# Patient Record
Sex: FEMALE | Race: WHITE | Employment: OTHER | URBAN - METROPOLITAN AREA
[De-identification: names, ages, dates, MRNs, and addresses within clinical notes are randomized per-mention and may not be internally consistent; named-entity substitution may affect disease eponyms.]

---

## 2021-03-23 ENCOUNTER — HOSPITAL ENCOUNTER (OUTPATIENT)
Age: 61
Discharge: HOME | End: 2021-03-23
Payer: MEDICARE

## 2021-03-23 ENCOUNTER — HOSPITAL ENCOUNTER (OUTPATIENT)
Dept: GENERAL RADIOLOGY | Age: 61
Discharge: HOME | End: 2021-03-23
Payer: MEDICARE

## 2021-03-23 DIAGNOSIS — M48.062 SPINAL STENOSIS, LUMBAR REGION WITH NEUROGENIC CLAUDICATION: ICD-10-CM

## 2021-03-23 PROCEDURE — 72110 X-RAY EXAM L-2 SPINE 4/>VWS: CPT

## 2021-06-02 ENCOUNTER — CLINICAL DOCUMENTATION (OUTPATIENT)
Dept: OTHER | Age: 61
End: 2021-06-02

## 2021-07-13 ENCOUNTER — APPOINTMENT (OUTPATIENT)
Dept: ULTRASOUND IMAGING | Age: 61
DRG: 372 | End: 2021-07-13
Payer: MEDICARE

## 2021-07-13 ENCOUNTER — APPOINTMENT (OUTPATIENT)
Dept: CT IMAGING | Age: 61
DRG: 372 | End: 2021-07-13
Payer: MEDICARE

## 2021-07-13 ENCOUNTER — HOSPITAL ENCOUNTER (INPATIENT)
Age: 61
LOS: 4 days | Discharge: HOME | DRG: 372 | End: 2021-07-17
Attending: EMERGENCY MEDICINE | Admitting: HOSPITALIST
Payer: MEDICARE

## 2021-07-13 DIAGNOSIS — R19.7 DIARRHEA, UNSPECIFIED TYPE: ICD-10-CM

## 2021-07-13 DIAGNOSIS — R53.1 GENERALIZED WEAKNESS: Primary | ICD-10-CM

## 2021-07-13 DIAGNOSIS — R19.7 DIARRHEA OF PRESUMED INFECTIOUS ORIGIN: ICD-10-CM

## 2021-07-13 DIAGNOSIS — Z74.09 DECREASED AMBULATION STATUS: ICD-10-CM

## 2021-07-13 PROBLEM — K52.9 COLITIS: Status: ACTIVE | Noted: 2021-07-13

## 2021-07-13 LAB
A/G RATIO: 1.5 (ref 1.1–2.2)
ALBUMIN SERPL-MCNC: 4.3 G/DL (ref 3.4–5)
ALP BLD-CCNC: 79 U/L (ref 40–129)
ALT SERPL-CCNC: 9 U/L (ref 10–40)
ANION GAP SERPL CALCULATED.3IONS-SCNC: 14 MMOL/L (ref 3–16)
AST SERPL-CCNC: 16 U/L (ref 15–37)
BASOPHILS ABSOLUTE: 0 K/UL (ref 0–0.2)
BASOPHILS RELATIVE PERCENT: 0.7 %
BILIRUB SERPL-MCNC: 0.8 MG/DL (ref 0–1)
BILIRUBIN URINE: NEGATIVE
BLOOD, URINE: NEGATIVE
BUN BLDV-MCNC: 13 MG/DL (ref 7–20)
C DIFF TOXIN/ANTIGEN: NORMAL
CALCIUM SERPL-MCNC: 9.7 MG/DL (ref 8.3–10.6)
CHLORIDE BLD-SCNC: 103 MMOL/L (ref 99–110)
CLARITY: CLEAR
CO2: 21 MMOL/L (ref 21–32)
COLOR: YELLOW
CREAT SERPL-MCNC: 0.6 MG/DL (ref 0.6–1.2)
CRYPTOSPORIDIUM ANTIGEN STOOL: NORMAL
E HISTOLYTICA ANTIGEN STOOL: NORMAL
EOSINOPHILS ABSOLUTE: 0 K/UL (ref 0–0.6)
EOSINOPHILS RELATIVE PERCENT: 0.2 %
GFR AFRICAN AMERICAN: >60
GFR NON-AFRICAN AMERICAN: >60
GIARDIA ANTIGEN STOOL: NORMAL
GLOBULIN: 2.9 G/DL
GLUCOSE BLD-MCNC: 102 MG/DL (ref 70–99)
GLUCOSE URINE: NEGATIVE MG/DL
HCT VFR BLD CALC: 40.8 % (ref 36–48)
HEMOGLOBIN: 14 G/DL (ref 12–16)
KETONES, URINE: 40 MG/DL
LACTIC ACID, SEPSIS: 0.8 MMOL/L (ref 0.4–1.9)
LACTIC ACID, SEPSIS: 1.1 MMOL/L (ref 0.4–1.9)
LEUKOCYTE ESTERASE, URINE: NEGATIVE
LIPASE: 25 U/L (ref 13–60)
LYMPHOCYTES ABSOLUTE: 2.2 K/UL (ref 1–5.1)
LYMPHOCYTES RELATIVE PERCENT: 49.5 %
MAGNESIUM: 1.9 MG/DL (ref 1.8–2.4)
MCH RBC QN AUTO: 27.4 PG (ref 26–34)
MCHC RBC AUTO-ENTMCNC: 34.4 G/DL (ref 31–36)
MCV RBC AUTO: 79.6 FL (ref 80–100)
MICROSCOPIC EXAMINATION: ABNORMAL
MONOCYTES ABSOLUTE: 0.3 K/UL (ref 0–1.3)
MONOCYTES RELATIVE PERCENT: 5.8 %
NEUTROPHILS ABSOLUTE: 1.9 K/UL (ref 1.7–7.7)
NEUTROPHILS RELATIVE PERCENT: 43.8 %
NITRITE, URINE: NEGATIVE
PDW BLD-RTO: 13.7 % (ref 12.4–15.4)
PH UA: 8.5 (ref 5–8)
PHOSPHORUS: 1.9 MG/DL (ref 2.5–4.9)
PLATELET # BLD: 163 K/UL (ref 135–450)
PMV BLD AUTO: 7.7 FL (ref 5–10.5)
POTASSIUM REFLEX MAGNESIUM: 3.2 MMOL/L (ref 3.5–5.1)
PROTEIN UA: NEGATIVE MG/DL
RBC # BLD: 5.12 M/UL (ref 4–5.2)
SODIUM BLD-SCNC: 138 MMOL/L (ref 136–145)
SPECIFIC GRAVITY UA: >1.03 (ref 1–1.03)
TOTAL PROTEIN: 7.2 G/DL (ref 6.4–8.2)
URINE REFLEX TO CULTURE: ABNORMAL
URINE TYPE: ABNORMAL
UROBILINOGEN, URINE: 0.2 E.U./DL
WBC # BLD: 4.4 K/UL (ref 4–11)

## 2021-07-13 PROCEDURE — 96375 TX/PRO/DX INJ NEW DRUG ADDON: CPT

## 2021-07-13 PROCEDURE — 96361 HYDRATE IV INFUSION ADD-ON: CPT

## 2021-07-13 PROCEDURE — 87336 ENTAMOEB HIST DISPR AG IA: CPT

## 2021-07-13 PROCEDURE — 36415 COLL VENOUS BLD VENIPUNCTURE: CPT

## 2021-07-13 PROCEDURE — 83690 ASSAY OF LIPASE: CPT

## 2021-07-13 PROCEDURE — 81003 URINALYSIS AUTO W/O SCOPE: CPT

## 2021-07-13 PROCEDURE — 87449 NOS EACH ORGANISM AG IA: CPT

## 2021-07-13 PROCEDURE — 85025 COMPLETE CBC W/AUTO DIFF WBC: CPT

## 2021-07-13 PROCEDURE — 87040 BLOOD CULTURE FOR BACTERIA: CPT

## 2021-07-13 PROCEDURE — 83605 ASSAY OF LACTIC ACID: CPT

## 2021-07-13 PROCEDURE — 87328 CRYPTOSPORIDIUM AG IA: CPT

## 2021-07-13 PROCEDURE — 93005 ELECTROCARDIOGRAM TRACING: CPT | Performed by: NURSE PRACTITIONER

## 2021-07-13 PROCEDURE — 96376 TX/PRO/DX INJ SAME DRUG ADON: CPT

## 2021-07-13 PROCEDURE — 76705 ECHO EXAM OF ABDOMEN: CPT

## 2021-07-13 PROCEDURE — 87505 NFCT AGENT DETECTION GI: CPT

## 2021-07-13 PROCEDURE — 6370000000 HC RX 637 (ALT 250 FOR IP): Performed by: NURSE PRACTITIONER

## 2021-07-13 PROCEDURE — 6370000000 HC RX 637 (ALT 250 FOR IP): Performed by: HOSPITALIST

## 2021-07-13 PROCEDURE — 74177 CT ABD & PELVIS W/CONTRAST: CPT

## 2021-07-13 PROCEDURE — 1200000000 HC SEMI PRIVATE

## 2021-07-13 PROCEDURE — 99283 EMERGENCY DEPT VISIT LOW MDM: CPT

## 2021-07-13 PROCEDURE — 96365 THER/PROPH/DIAG IV INF INIT: CPT

## 2021-07-13 PROCEDURE — 2500000003 HC RX 250 WO HCPCS: Performed by: NURSE PRACTITIONER

## 2021-07-13 PROCEDURE — 2580000003 HC RX 258: Performed by: HOSPITALIST

## 2021-07-13 PROCEDURE — 83735 ASSAY OF MAGNESIUM: CPT

## 2021-07-13 PROCEDURE — 6360000002 HC RX W HCPCS: Performed by: NURSE PRACTITIONER

## 2021-07-13 PROCEDURE — 80053 COMPREHEN METABOLIC PANEL: CPT

## 2021-07-13 PROCEDURE — 2580000003 HC RX 258: Performed by: NURSE PRACTITIONER

## 2021-07-13 PROCEDURE — 87324 CLOSTRIDIUM AG IA: CPT

## 2021-07-13 PROCEDURE — 84100 ASSAY OF PHOSPHORUS: CPT

## 2021-07-13 PROCEDURE — 6360000004 HC RX CONTRAST MEDICATION: Performed by: NURSE PRACTITIONER

## 2021-07-13 RX ORDER — ONDANSETRON 2 MG/ML
4 INJECTION INTRAMUSCULAR; INTRAVENOUS EVERY 30 MIN PRN
Status: COMPLETED | OUTPATIENT
Start: 2021-07-13 | End: 2021-07-13

## 2021-07-13 RX ORDER — BACLOFEN 500 UG/ML
1 INJECTION, SOLUTION INTRATHECAL DAILY
Status: DISCONTINUED | OUTPATIENT
Start: 2021-07-14 | End: 2021-07-17 | Stop reason: HOSPADM

## 2021-07-13 RX ORDER — VITAMIN B COMPLEX
2000 TABLET ORAL 2 TIMES DAILY
Status: DISCONTINUED | OUTPATIENT
Start: 2021-07-13 | End: 2021-07-17 | Stop reason: HOSPADM

## 2021-07-13 RX ORDER — TIZANIDINE 4 MG/1
4 TABLET ORAL DAILY
COMMUNITY

## 2021-07-13 RX ORDER — LISINOPRIL 5 MG/1
2.5 TABLET ORAL DAILY
COMMUNITY
Start: 2021-04-08

## 2021-07-13 RX ORDER — NADOLOL 40 MG/1
40 TABLET ORAL DAILY
Status: DISCONTINUED | OUTPATIENT
Start: 2021-07-14 | End: 2021-07-17 | Stop reason: HOSPADM

## 2021-07-13 RX ORDER — POLYETHYLENE GLYCOL 3350 17 G/17G
17 POWDER, FOR SOLUTION ORAL DAILY PRN
Status: DISCONTINUED | OUTPATIENT
Start: 2021-07-13 | End: 2021-07-17 | Stop reason: HOSPADM

## 2021-07-13 RX ORDER — SODIUM CHLORIDE 0.9 % (FLUSH) 0.9 %
5-40 SYRINGE (ML) INJECTION PRN
Status: DISCONTINUED | OUTPATIENT
Start: 2021-07-13 | End: 2021-07-17 | Stop reason: HOSPADM

## 2021-07-13 RX ORDER — CIPROFLOXACIN 2 MG/ML
400 INJECTION, SOLUTION INTRAVENOUS EVERY 12 HOURS
Status: DISCONTINUED | OUTPATIENT
Start: 2021-07-14 | End: 2021-07-17

## 2021-07-13 RX ORDER — SODIUM CHLORIDE 9 MG/ML
INJECTION, SOLUTION INTRAVENOUS CONTINUOUS
Status: DISCONTINUED | OUTPATIENT
Start: 2021-07-13 | End: 2021-07-15

## 2021-07-13 RX ORDER — 0.9 % SODIUM CHLORIDE 0.9 %
1000 INTRAVENOUS SOLUTION INTRAVENOUS ONCE
Status: COMPLETED | OUTPATIENT
Start: 2021-07-13 | End: 2021-07-13

## 2021-07-13 RX ORDER — SUMATRIPTAN 50 MG/1
100 TABLET, FILM COATED ORAL DAILY PRN
Status: COMPLETED | OUTPATIENT
Start: 2021-07-13 | End: 2021-07-14

## 2021-07-13 RX ORDER — PRAVASTATIN SODIUM 10 MG
20 TABLET ORAL NIGHTLY
Status: DISCONTINUED | OUTPATIENT
Start: 2021-07-13 | End: 2021-07-17 | Stop reason: HOSPADM

## 2021-07-13 RX ORDER — ONDANSETRON 2 MG/ML
4 INJECTION INTRAMUSCULAR; INTRAVENOUS EVERY 6 HOURS PRN
Status: DISCONTINUED | OUTPATIENT
Start: 2021-07-13 | End: 2021-07-17 | Stop reason: HOSPADM

## 2021-07-13 RX ORDER — POTASSIUM CHLORIDE 20 MEQ/1
40 TABLET, EXTENDED RELEASE ORAL 2 TIMES DAILY WITH MEALS
Status: DISCONTINUED | OUTPATIENT
Start: 2021-07-13 | End: 2021-07-13

## 2021-07-13 RX ORDER — PRAVASTATIN SODIUM 20 MG
20 TABLET ORAL DAILY
COMMUNITY

## 2021-07-13 RX ORDER — ESCITALOPRAM OXALATE 10 MG/1
10 TABLET ORAL DAILY
Status: DISCONTINUED | OUTPATIENT
Start: 2021-07-14 | End: 2021-07-17 | Stop reason: HOSPADM

## 2021-07-13 RX ORDER — ACETAMINOPHEN 650 MG/1
650 SUPPOSITORY RECTAL EVERY 6 HOURS PRN
Status: DISCONTINUED | OUTPATIENT
Start: 2021-07-13 | End: 2021-07-17 | Stop reason: HOSPADM

## 2021-07-13 RX ORDER — TIZANIDINE 4 MG/1
4 TABLET ORAL DAILY
Status: DISCONTINUED | OUTPATIENT
Start: 2021-07-14 | End: 2021-07-17 | Stop reason: HOSPADM

## 2021-07-13 RX ORDER — PRAVASTATIN SODIUM 20 MG
20 TABLET ORAL DAILY
COMMUNITY
Start: 2021-04-08 | End: 2021-07-13

## 2021-07-13 RX ORDER — PANTOPRAZOLE SODIUM 40 MG/1
40 TABLET, DELAYED RELEASE ORAL
Status: DISCONTINUED | OUTPATIENT
Start: 2021-07-14 | End: 2021-07-17 | Stop reason: HOSPADM

## 2021-07-13 RX ORDER — OMEPRAZOLE 40 MG/1
40 CAPSULE, DELAYED RELEASE ORAL DAILY
COMMUNITY

## 2021-07-13 RX ORDER — ONDANSETRON 4 MG/1
4 TABLET, ORALLY DISINTEGRATING ORAL EVERY 8 HOURS PRN
Status: DISCONTINUED | OUTPATIENT
Start: 2021-07-13 | End: 2021-07-17 | Stop reason: HOSPADM

## 2021-07-13 RX ORDER — MONTELUKAST SODIUM 10 MG/1
10 TABLET ORAL NIGHTLY
Status: DISCONTINUED | OUTPATIENT
Start: 2021-07-13 | End: 2021-07-17 | Stop reason: HOSPADM

## 2021-07-13 RX ORDER — SODIUM CHLORIDE 9 MG/ML
25 INJECTION, SOLUTION INTRAVENOUS PRN
Status: DISCONTINUED | OUTPATIENT
Start: 2021-07-13 | End: 2021-07-17 | Stop reason: HOSPADM

## 2021-07-13 RX ORDER — POTASSIUM CHLORIDE 7.45 MG/ML
10 INJECTION INTRAVENOUS PRN
Status: DISCONTINUED | OUTPATIENT
Start: 2021-07-13 | End: 2021-07-17 | Stop reason: HOSPADM

## 2021-07-13 RX ORDER — SODIUM CHLORIDE 0.9 % (FLUSH) 0.9 %
5-40 SYRINGE (ML) INJECTION EVERY 12 HOURS SCHEDULED
Status: DISCONTINUED | OUTPATIENT
Start: 2021-07-13 | End: 2021-07-17 | Stop reason: HOSPADM

## 2021-07-13 RX ORDER — CIPROFLOXACIN 2 MG/ML
400 INJECTION, SOLUTION INTRAVENOUS ONCE
Status: COMPLETED | OUTPATIENT
Start: 2021-07-13 | End: 2021-07-13

## 2021-07-13 RX ORDER — ACETAMINOPHEN 325 MG/1
650 TABLET ORAL EVERY 6 HOURS PRN
Status: DISCONTINUED | OUTPATIENT
Start: 2021-07-13 | End: 2021-07-17 | Stop reason: HOSPADM

## 2021-07-13 RX ADMIN — MONTELUKAST 10 MG: 10 TABLET, FILM COATED ORAL at 22:16

## 2021-07-13 RX ADMIN — IOPAMIDOL 75 ML: 755 INJECTION, SOLUTION INTRAVENOUS at 15:14

## 2021-07-13 RX ADMIN — Medication 2000 UNITS: at 22:16

## 2021-07-13 RX ADMIN — CIPROFLOXACIN 400 MG: 2 INJECTION, SOLUTION INTRAVENOUS at 18:47

## 2021-07-13 RX ADMIN — SODIUM CHLORIDE 1000 ML: 9 INJECTION, SOLUTION INTRAVENOUS at 18:38

## 2021-07-13 RX ADMIN — SODIUM CHLORIDE 1000 ML: 9 INJECTION, SOLUTION INTRAVENOUS at 15:57

## 2021-07-13 RX ADMIN — ONDANSETRON 4 MG: 2 INJECTION INTRAMUSCULAR; INTRAVENOUS at 15:55

## 2021-07-13 RX ADMIN — ONDANSETRON 4 MG: 2 INJECTION INTRAMUSCULAR; INTRAVENOUS at 18:46

## 2021-07-13 RX ADMIN — POTASSIUM CHLORIDE 40 MEQ: 1500 TABLET, EXTENDED RELEASE ORAL at 18:46

## 2021-07-13 RX ADMIN — Medication 20 MG: at 15:56

## 2021-07-13 RX ADMIN — SODIUM CHLORIDE, PRESERVATIVE FREE 10 ML: 5 INJECTION INTRAVENOUS at 22:15

## 2021-07-13 RX ADMIN — PRAVASTATIN SODIUM 20 MG: 10 TABLET ORAL at 22:16

## 2021-07-13 RX ADMIN — SODIUM CHLORIDE: 9 INJECTION, SOLUTION INTRAVENOUS at 22:16

## 2021-07-13 RX ADMIN — METRONIDAZOLE 500 MG: 500 INJECTION, SOLUTION INTRAVENOUS at 20:04

## 2021-07-13 ASSESSMENT — PAIN SCALES - GENERAL
PAINLEVEL_OUTOF10: 0
PAINLEVEL_OUTOF10: 7

## 2021-07-13 ASSESSMENT — PAIN DESCRIPTION - LOCATION: LOCATION: LEG

## 2021-07-13 ASSESSMENT — PAIN DESCRIPTION - PAIN TYPE: TYPE: CHRONIC PAIN

## 2021-07-13 ASSESSMENT — PAIN DESCRIPTION - ORIENTATION: ORIENTATION: RIGHT;LEFT

## 2021-07-13 NOTE — ED NOTES
Requested pt to get out of bed to test ambulation, pt stated \" I can't walk let alone get out of bed\" Chris Allen NP aware      Francine Nice RN  07/13/21 9785

## 2021-07-13 NOTE — ED PROVIDER NOTES
I independently evaluated and obtained a history and physical on Moent Walden. All diagnostic, treatment, and disposition assistants were made to myself in conjunction the advanced practice provider. For further details of this patient's emergency department encounter, please see the advanced practice provider's documentation. History: 78-year-old female presents with nausea, vomiting, diarrhea and abdominal pain which is been present for multiple weeks but is progressively worsened despite outpatient management    Physician Exam:   Constitutional:  Well developed, well nourished, no acute distress  Eyes:  Sclera nonicteric, conjunctiva moist  HENT:  Atraumatic, nose normal  Neck: Supple, no JVD  Respiratory:  Lungs clear to auscultation bilaterally, no retractions, no accessory muscle use  Cardiovascular: Bradycardic rate, normal rhythm, no murmurs  GI:  Soft, no overt abdominal tenderness, no McBurney's point tenderness, no guarding, bowel sounds present, no audible bruits or palpable pulsatile masses. Back: no CVA tenderness  Musculoskeletal:  No edema, no acute deformities  Integument: No rash, dry skin. Does seem flushed and diaphoretic. Neurologic:  Alert & oriented, no slurred speech    Mdm: Labs are generally reassuring. C. difficile testing sent out. CT shows evidence of inflammation but no evidence of intestinal surgical complication otherwise. The patient states she is still too weak to ambulate and has been unsteady on her feet and generally fatigued so we will continue IV fluid along with Cipro/Flagyl and admit.     Impression: Diarrhea, weakness, dumping    (Please note that portions of this note may have been completed with a voice recognition program. Efforts were made to edit the dictations but occasionally words are mis-transcribed.)         Enoc Romero MD  07/14/21 9709

## 2021-07-13 NOTE — ED PROVIDER NOTES
[address] [facility]  [address] [address]  Dept: [phone]  Loc: [address] ENCOUNTER        This patient was seen and evaluated per myself in conjunction with ED attending Dr. Yris Higgins. CHIEF COMPLAINT    Chief Complaint   Patient presents with    Fatigue     Cold sweats for about 3 weeks, having weakness, and now loose stools since Friday. Saw PCP yesterday and she was sent for blood work.  Diarrhea       HPI    Annetta Lagunas is a 59 y.o. female who presents with nausea and vomiting, associated with diarrhea. Onset was about 3 weeks ago, originally started with intermittent chills and sweats. Then she developed some diarrhea intermittently. However it is progressively worsened over the past week, therefore she made an appointment with her primary care provider yesterday. Was sent to [facility] for outpatient blood testing to be done. She states that she then had worsening abdominal pain and diarrhea with increased generalized fatigue over the past 24 hours since she saw her PCP, she called their office and was told to come to the ED for further testing and evaluation. The duration has been intermittent since the onset. The quality of the diarrhea is watery. She states that it had a very foul smelling odor to it yesterday. She states that it was also \"bright yellow. \"  No previous abdominal surgeries other than a pain pump being placed that resides in her abdominal cavity. The context is that the symptoms started spontaneously. There are no aggravating or alleviating factors. The patient complains of associated diffuse abdominal cramping. The patient denies the following risk factors: Ingestion of well water or stream water, recent travel out of the country, or antibiotic use. Came to the ED for further evaluation and treatment as requested by her primary care provider staff.     REVIEW OF SYSTEMS GI: see HPI, No hematochezia  Cardiac: No chest pain, no syncope  Pulmonary: No difficulty breathing, no new cough  General: No fevers  : No hematuria, no dysuria  See HPI for further details. All other systems reviewed and are negative. PAST MEDICAL & SURGICAL HISTORY    Past Medical History:   Diagnosis Date    Depression     Hypertension     IBS (irritable bowel syndrome)     Migraines     RA (rheumatoid arthritis) (HCC)     RSD (reflex sympathetic dystrophy)      Past Surgical History:   Procedure Laterality Date    BACLOFEN PUMP IMPLANTATION Left     Pain pump    KNEE SURGERY Right     Arthroscopy / severed a nerve    NERVE SURGERY Right     Nerve clipping Rt leg above knee    SYMPATHECTOMY      TONSILLECTOMY      UPPER GASTROINTESTINAL ENDOSCOPY  10/03/2016       CURRENT MEDICATIONS  (may include discharge medications prescribed in the ED)   Current Outpatient Rx   Medication Sig Dispense Refill    abatacept (ORENCIA) 250 MG injection Infuse 125 mg intravenously once a week      vitamin D (CHOLECALCIFEROL) 25 MCG (1000 UT) TABS tablet Take 2,000 Units by mouth 2 times daily      lisinopril (PRINIVIL;ZESTRIL) 5 MG tablet Take 2.5 mg by mouth daily If SBP > 130      omeprazole (PRILOSEC) 40 MG delayed release capsule Take 40 mg by mouth daily      tiZANidine (ZANAFLEX) 4 MG tablet Take 4 mg by mouth daily      pravastatin (PRAVACHOL) 20 MG tablet Take 20 mg by mouth daily      CLONIDINE HCL, ANALGESIA, EP 49.95 mcg by Intrathecal route daily Pain pump syringe 200.0 mcg / ml      lubiprostone (AMITIZA) 8 MCG CAPS capsule Take 8 mcg by mouth 2 times daily (with meals)      nadolol (CORGARD) 40 MG tablet Take 40 mg by mouth daily.  triamterene-hydrochlorothiazide (DYAZIDE) 37.5-25 MG per capsule Take 1 capsule by mouth every morning.  montelukast (SINGULAIR) 10 MG tablet Take 10 mg by mouth nightly.       SUMAtriptan (IMITREX) 100 MG tablet Take 100 mg by mouth once as needed for Migraine.  escitalopram (LEXAPRO) 10 MG tablet Take 10 mg by mouth daily.  HYDROmorphone (DILAUDID) 10 MG/ML injection 4.995 mg by Intrathecal route daily Pain pump Syringe 20.0mg/ ml      baclofen (LIORESAL) 10 MG/20ML SOLN 149.85 mcg by Intrathecal route daily Pain pump syringe 600.0 mcg/ ml         ALLERGIES    Allergies   Allergen Reactions    Latex Rash    Pcn [Penicillins]        SOCIAL AND FAMILY HISTORY    Social History     Socioeconomic History    Marital status: Single     Spouse name: Not on file    Number of children: Not on file    Years of education: Not on file    Highest education level: Not on file   Occupational History    Not on file   Tobacco Use    Smoking status: Never Smoker    Smokeless tobacco: Never Used   Substance and Sexual Activity    Alcohol use: No    Drug use: No    Sexual activity: Not on file   Other Topics Concern    Not on file   Social History Narrative    Not on file     Social Determinants of Health     Financial Resource Strain:     Difficulty of Paying Living Expenses:    Food Insecurity:     Worried About Running Out of Food in the Last Year:     Ran Out of Food in the Last Year:    Transportation Needs:     Lack of Transportation (Medical):      Lack of Transportation (Non-Medical):    Physical Activity:     Days of Exercise per Week:     Minutes of Exercise per Session:    Stress:     Feeling of Stress :    Social Connections:     Frequency of Communication with Friends and Family:     Frequency of Social Gatherings with Friends and Family:     Attends Holiness Services:     Active Member of Clubs or Organizations:     Attends Club or Organization Meetings:     Marital Status:    Intimate Partner Violence:     Fear of Current or Ex-Partner:     Emotionally Abused:     Physically Abused:     Sexually Abused:      Family History   Problem Relation Age of Onset    Cancer Mother     Cancer Father     Heart Disease Brother     Cancer Maternal Grandmother     Cancer Maternal Grandfather        PHYSICAL EXAM    VITAL SIGNS: BP (!) 152/71   Pulse 51   Temp 97.4 °F (36.3 °C) (Oral)   Resp 20   Ht 5' 3.5\" (1.613 m)   Wt 156 lb (70.8 kg)   SpO2 100%   BMI 27.20 kg/m²   Constitutional:  Well developed, well nourished, no acute distress  Eyes:  Sclera nonicteric, conjunctiva moist  HENT:  Atraumatic, nose normal  Neck: Supple, no JVD  Respiratory:  Lungs clear to auscultation bilaterally, no retractions, no accessory muscle use  Cardiovascular: Bradycardic rate, normal rhythm, no murmurs  GI:  Soft, no overt abdominal tenderness, no McBurney's point tenderness, no guarding, bowel sounds present, no audible bruits or palpable pulsatile masses. Back: no CVA tenderness  Musculoskeletal:  No edema, no acute deformities  Integument: No rash, dry skin. Does seem flushed and diaphoretic.   Neurologic:  Alert & oriented, no slurred speech    LABS  Results for orders placed or performed during the hospital encounter of 07/13/21   Clostridium Difficile Toxin/Antigen    Specimen: Stool   Result Value Ref Range    C.diff Toxin/Antigen       Negative for Clostridium difficile antigen and toxin  Normal Range: Negative     CBC Auto Differential   Result Value Ref Range    WBC 4.4 4.0 - 11.0 K/uL    RBC 5.12 4.00 - 5.20 M/uL    Hemoglobin 14.0 12.0 - 16.0 g/dL    Hematocrit 40.8 36.0 - 48.0 %    MCV 79.6 (L) 80.0 - 100.0 fL    MCH 27.4 26.0 - 34.0 pg    MCHC 34.4 31.0 - 36.0 g/dL    RDW 13.7 12.4 - 15.4 %    Platelets 449 231 - 868 K/uL    MPV 7.7 5.0 - 10.5 fL    Neutrophils % 43.8 %    Lymphocytes % 49.5 %    Monocytes % 5.8 %    Eosinophils % 0.2 %    Basophils % 0.7 %    Neutrophils Absolute 1.9 1.7 - 7.7 K/uL    Lymphocytes Absolute 2.2 1.0 - 5.1 K/uL    Monocytes Absolute 0.3 0.0 - 1.3 K/uL    Eosinophils Absolute 0.0 0.0 - 0.6 K/uL    Basophils Absolute 0.0 0.0 - 0.2 K/uL   Comprehensive Metabolic Panel w/ Reflex to MG   Result Value Ref Range    Sodium 138 136 - 145 mmol/L    Potassium reflex Magnesium 3.2 (L) 3.5 - 5.1 mmol/L    Chloride 103 99 - 110 mmol/L    CO2 21 21 - 32 mmol/L    Anion Gap 14 3 - 16    Glucose 102 (H) 70 - 99 mg/dL    BUN 13 7 - 20 mg/dL    CREATININE 0.6 0.6 - 1.2 mg/dL    GFR Non-African American >60 >60    GFR African American >60 >60    Calcium 9.7 8.3 - 10.6 mg/dL    Total Protein 7.2 6.4 - 8.2 g/dL    Albumin 4.3 3.4 - 5.0 g/dL    Albumin/Globulin Ratio 1.5 1.1 - 2.2    Total Bilirubin 0.8 0.0 - 1.0 mg/dL    Alkaline Phosphatase 79 40 - 129 U/L    ALT 9 (L) 10 - 40 U/L    AST 16 15 - 37 U/L    Globulin 2.9 g/dL   Lipase   Result Value Ref Range    Lipase 25.0 13.0 - 60.0 U/L   Urinalysis Reflex to Culture    Specimen: Urine, clean catch   Result Value Ref Range    Color, UA YELLOW Straw/Yellow    Clarity, UA Clear Clear    Glucose, Ur Negative Negative mg/dL    Bilirubin Urine Negative Negative    Ketones, Urine 40 (A) Negative mg/dL    Specific Gravity, UA >1.030 1.005 - 1.030    Blood, Urine Negative Negative    pH, UA 8.5 (A) 5.0 - 8.0    Protein, UA Negative Negative mg/dL    Urobilinogen, Urine 0.2 <2.0 E.U./dL    Nitrite, Urine Negative Negative    Leukocyte Esterase, Urine Negative Negative    Microscopic Examination Not Indicated     Urine Type NotGiven     Urine Reflex to Culture Not Indicated    Lactate, Sepsis   Result Value Ref Range    Lactic Acid, Sepsis 1.1 0.4 - 1.9 mmol/L   Magnesium   Result Value Ref Range    Magnesium 1.90 1.80 - 2.40 mg/dL         RADIOLOGY    CT ABDOMEN PELVIS W IV CONTRAST Additional Contrast? None   Final Result   The wall of the rectum and sigmoid appears thickened. Although the findings   may be artifactual due to collapse, findings concerning for an infectious or   inflammatory etiology. No evidence of obstruction. US GALLBLADDER RUQ   Final Result   Unremarkable right upper quadrant ultrasound.              CRITICAL CARE NOTE:  There was a high probability of clinically significant life-threatening deterioration of the patient's condition requiring my urgent intervention. Total critical care time was at least 20 minutes. This includes vital sign monitoring, pulse oximetry monitoring, telemetry monitoring, clinical response to the IV medications, reviewing the nursing notes, consultation time, dictation/documentation time, and interpretation of the labwork. This excludes any separately billable procedures performed. The critical care time above also includes time spent obtaining the patient's results of her outpatient testing yesterday AND the testing obtained was directly relevant to the care of the patient. ED COURSE & MEDICAL DECISION MAKING    Pertinent Labs reviewed and interpreted. (See chart for details)   See chart for details of medications given during the ED stay. Vitals:    07/13/21 1401   BP: (!) 152/71   Pulse: 51   Resp: 20   Temp: 97.4 °F (36.3 °C)   TempSrc: Oral   SpO2: 100%   Weight: 156 lb (70.8 kg)   Height: 5' 3.5\" (1.613 m)       Differential diagnosis: Dehydration, Bacterial vs Viral GI illness, Ischemic Bowel, Bowel Obstruction, Acute Cholecystitis, Acute Appendicitis, Acute Pancreatitis, Diverticulitis, Pyelonephritis, UTI, STD, Gonad Torsion, other        Patient is afebrile, slightly bradycardic but otherwise hemodynamically stable. She does appear flushed and diaphoretic somewhat ill-appearing. She has no overt tenderness on abdominal exam.      Labs reveal no leukocytosis or anemia. No lactic acidosis. Cultures are in process. Metabolic panel shows no severe electrolyte derangements or JOHNNY. LFTs and Lipase within normal limits. Urinalysis unremarkable. CT findings as above. Most stool studies are still in process. She was negative however for C. difficile. We attempted to ambulate the patient multiple times. She was too weak and fatigued to ambulate, impaired ability.  She is very unsteady on her feet and we did stand her up according to the nursing staff. She is not safe to be discharged to go home alone, she does not feel safe. Therefore will admit for further evaluation and treatment. Was started on Cipro and Flagyl. FINAL IMPRESSION    1. Generalized weakness    2. Diarrhea of presumed infectious origin    3.  Decreased ambulation status        PLAN  Admission       (Please note that this note was completed with a voice recognition program.  Every attempt was made to edit the dictations, but inevitably there remain words that are mis-transcribed.)          JOB Taylor CNP  07/13/21 2003

## 2021-07-14 LAB
ALBUMIN SERPL-MCNC: 3.3 G/DL (ref 3.4–5)
ALP BLD-CCNC: 58 U/L (ref 40–129)
ALT SERPL-CCNC: 7 U/L (ref 10–40)
ANION GAP SERPL CALCULATED.3IONS-SCNC: 8 MMOL/L (ref 3–16)
AST SERPL-CCNC: 14 U/L (ref 15–37)
BASOPHILS ABSOLUTE: 0 K/UL (ref 0–0.2)
BASOPHILS RELATIVE PERCENT: 0.3 %
BILIRUB SERPL-MCNC: 0.5 MG/DL (ref 0–1)
BILIRUBIN DIRECT: <0.2 MG/DL (ref 0–0.3)
BILIRUBIN, INDIRECT: ABNORMAL MG/DL (ref 0–1)
BUN BLDV-MCNC: 7 MG/DL (ref 7–20)
C-REACTIVE PROTEIN: 3.7 MG/L (ref 0–5.1)
CALCIUM SERPL-MCNC: 8.4 MG/DL (ref 8.3–10.6)
CHLORIDE BLD-SCNC: 109 MMOL/L (ref 99–110)
CO2: 23 MMOL/L (ref 21–32)
CREAT SERPL-MCNC: 0.7 MG/DL (ref 0.6–1.2)
EKG ATRIAL RATE: 49 BPM
EKG DIAGNOSIS: NORMAL
EKG P AXIS: 64 DEGREES
EKG P-R INTERVAL: 156 MS
EKG Q-T INTERVAL: 458 MS
EKG QRS DURATION: 94 MS
EKG QTC CALCULATION (BAZETT): 413 MS
EKG R AXIS: 28 DEGREES
EKG T AXIS: 37 DEGREES
EKG VENTRICULAR RATE: 49 BPM
EOSINOPHILS ABSOLUTE: 0 K/UL (ref 0–0.6)
EOSINOPHILS RELATIVE PERCENT: 0.7 %
GFR AFRICAN AMERICAN: >60
GFR NON-AFRICAN AMERICAN: >60
GLUCOSE BLD-MCNC: 114 MG/DL (ref 70–99)
HCT VFR BLD CALC: 34.7 % (ref 36–48)
HEMOGLOBIN: 12.1 G/DL (ref 12–16)
LACTIC ACID: 0.7 MMOL/L (ref 0.4–2)
LYMPHOCYTES ABSOLUTE: 1.7 K/UL (ref 1–5.1)
LYMPHOCYTES RELATIVE PERCENT: 46.3 %
MAGNESIUM: 1.8 MG/DL (ref 1.8–2.4)
MCH RBC QN AUTO: 28.2 PG (ref 26–34)
MCHC RBC AUTO-ENTMCNC: 34.8 G/DL (ref 31–36)
MCV RBC AUTO: 80.9 FL (ref 80–100)
MONOCYTES ABSOLUTE: 0.3 K/UL (ref 0–1.3)
MONOCYTES RELATIVE PERCENT: 8.6 %
NEUTROPHILS ABSOLUTE: 1.7 K/UL (ref 1.7–7.7)
NEUTROPHILS RELATIVE PERCENT: 44.1 %
PDW BLD-RTO: 13.5 % (ref 12.4–15.4)
PLATELET # BLD: 123 K/UL (ref 135–450)
PMV BLD AUTO: 7.5 FL (ref 5–10.5)
POTASSIUM REFLEX MAGNESIUM: 3.4 MMOL/L (ref 3.5–5.1)
RBC # BLD: 4.29 M/UL (ref 4–5.2)
SEDIMENTATION RATE, ERYTHROCYTE: 19 MM/HR (ref 0–30)
SODIUM BLD-SCNC: 140 MMOL/L (ref 136–145)
TOTAL PROTEIN: 5.6 G/DL (ref 6.4–8.2)
TSH REFLEX: 1.66 UIU/ML (ref 0.27–4.2)
WBC # BLD: 3.8 K/UL (ref 4–11)

## 2021-07-14 PROCEDURE — 83735 ASSAY OF MAGNESIUM: CPT

## 2021-07-14 PROCEDURE — 84443 ASSAY THYROID STIM HORMONE: CPT

## 2021-07-14 PROCEDURE — 6360000002 HC RX W HCPCS: Performed by: HOSPITALIST

## 2021-07-14 PROCEDURE — 85652 RBC SED RATE AUTOMATED: CPT

## 2021-07-14 PROCEDURE — 6370000000 HC RX 637 (ALT 250 FOR IP): Performed by: NURSE PRACTITIONER

## 2021-07-14 PROCEDURE — 84586 ASSAY OF VIP: CPT

## 2021-07-14 PROCEDURE — 1200000000 HC SEMI PRIVATE

## 2021-07-14 PROCEDURE — 2580000003 HC RX 258: Performed by: HOSPITALIST

## 2021-07-14 PROCEDURE — 83520 IMMUNOASSAY QUANT NOS NONAB: CPT

## 2021-07-14 PROCEDURE — 83605 ASSAY OF LACTIC ACID: CPT

## 2021-07-14 PROCEDURE — 6370000000 HC RX 637 (ALT 250 FOR IP): Performed by: HOSPITALIST

## 2021-07-14 PROCEDURE — 80048 BASIC METABOLIC PNL TOTAL CA: CPT

## 2021-07-14 PROCEDURE — 83516 IMMUNOASSAY NONANTIBODY: CPT

## 2021-07-14 PROCEDURE — 82941 ASSAY OF GASTRIN: CPT

## 2021-07-14 PROCEDURE — 82308 ASSAY OF CALCITONIN: CPT

## 2021-07-14 PROCEDURE — 93010 ELECTROCARDIOGRAM REPORT: CPT | Performed by: INTERNAL MEDICINE

## 2021-07-14 PROCEDURE — 36415 COLL VENOUS BLD VENIPUNCTURE: CPT

## 2021-07-14 PROCEDURE — 82784 ASSAY IGA/IGD/IGG/IGM EACH: CPT

## 2021-07-14 PROCEDURE — 83088 ASSAY OF HISTAMINE: CPT

## 2021-07-14 PROCEDURE — 83497 ASSAY OF 5-HIAA: CPT

## 2021-07-14 PROCEDURE — 2500000003 HC RX 250 WO HCPCS: Performed by: HOSPITALIST

## 2021-07-14 PROCEDURE — 86316 IMMUNOASSAY TUMOR OTHER: CPT

## 2021-07-14 PROCEDURE — 85025 COMPLETE CBC W/AUTO DIFF WBC: CPT

## 2021-07-14 PROCEDURE — 86140 C-REACTIVE PROTEIN: CPT

## 2021-07-14 PROCEDURE — 82943 ASSAY OF GLUCAGON: CPT

## 2021-07-14 PROCEDURE — 80076 HEPATIC FUNCTION PANEL: CPT

## 2021-07-14 RX ORDER — DICYCLOMINE HYDROCHLORIDE 10 MG/1
20 CAPSULE ORAL
Status: DISCONTINUED | OUTPATIENT
Start: 2021-07-14 | End: 2021-07-17 | Stop reason: HOSPADM

## 2021-07-14 RX ORDER — SIMETHICONE 80 MG
80 TABLET,CHEWABLE ORAL EVERY 6 HOURS PRN
Status: DISCONTINUED | OUTPATIENT
Start: 2021-07-14 | End: 2021-07-17 | Stop reason: HOSPADM

## 2021-07-14 RX ADMIN — ACETAMINOPHEN 650 MG: 325 TABLET ORAL at 17:52

## 2021-07-14 RX ADMIN — ACETAMINOPHEN 650 MG: 325 TABLET ORAL at 08:42

## 2021-07-14 RX ADMIN — ESCITALOPRAM OXALATE 10 MG: 10 TABLET ORAL at 08:42

## 2021-07-14 RX ADMIN — PANTOPRAZOLE SODIUM 40 MG: 40 TABLET, DELAYED RELEASE ORAL at 05:31

## 2021-07-14 RX ADMIN — Medication 2000 UNITS: at 08:42

## 2021-07-14 RX ADMIN — SIMETHICONE 80 MG: 80 TABLET, CHEWABLE ORAL at 01:02

## 2021-07-14 RX ADMIN — SODIUM CHLORIDE: 9 INJECTION, SOLUTION INTRAVENOUS at 15:53

## 2021-07-14 RX ADMIN — METRONIDAZOLE 500 MG: 500 INJECTION, SOLUTION INTRAVENOUS at 20:48

## 2021-07-14 RX ADMIN — POTASSIUM CHLORIDE 10 MEQ: 7.46 INJECTION, SOLUTION INTRAVENOUS at 11:06

## 2021-07-14 RX ADMIN — METRONIDAZOLE 500 MG: 500 INJECTION, SOLUTION INTRAVENOUS at 13:50

## 2021-07-14 RX ADMIN — CIPROFLOXACIN 400 MG: 2 INJECTION, SOLUTION INTRAVENOUS at 05:32

## 2021-07-14 RX ADMIN — POTASSIUM CHLORIDE 10 MEQ: 7.46 INJECTION, SOLUTION INTRAVENOUS at 12:38

## 2021-07-14 RX ADMIN — ENOXAPARIN SODIUM 40 MG: 40 INJECTION SUBCUTANEOUS at 08:42

## 2021-07-14 RX ADMIN — DICYCLOMINE HYDROCHLORIDE 20 MG: 10 CAPSULE ORAL at 15:55

## 2021-07-14 RX ADMIN — DICYCLOMINE HYDROCHLORIDE 20 MG: 10 CAPSULE ORAL at 05:31

## 2021-07-14 RX ADMIN — Medication 2000 UNITS: at 20:48

## 2021-07-14 RX ADMIN — TIZANIDINE 4 MG: 4 TABLET ORAL at 08:42

## 2021-07-14 RX ADMIN — METRONIDAZOLE 500 MG: 500 INJECTION, SOLUTION INTRAVENOUS at 04:33

## 2021-07-14 RX ADMIN — POTASSIUM CHLORIDE 10 MEQ: 7.46 INJECTION, SOLUTION INTRAVENOUS at 08:44

## 2021-07-14 RX ADMIN — SIMETHICONE 80 MG: 80 TABLET, CHEWABLE ORAL at 23:20

## 2021-07-14 RX ADMIN — DICYCLOMINE HYDROCHLORIDE 20 MG: 10 CAPSULE ORAL at 11:06

## 2021-07-14 RX ADMIN — MONTELUKAST 10 MG: 10 TABLET, FILM COATED ORAL at 20:48

## 2021-07-14 RX ADMIN — PRAVASTATIN SODIUM 20 MG: 10 TABLET ORAL at 20:48

## 2021-07-14 RX ADMIN — POTASSIUM CHLORIDE 10 MEQ: 7.46 INJECTION, SOLUTION INTRAVENOUS at 15:53

## 2021-07-14 RX ADMIN — ACETAMINOPHEN 650 MG: 325 TABLET ORAL at 23:21

## 2021-07-14 RX ADMIN — SODIUM PHOSPHATE, MONOBASIC, MONOHYDRATE AND SODIUM PHOSPHATE, DIBASIC, ANHYDROUS 10 MMOL: 276; 142 INJECTION, SOLUTION INTRAVENOUS at 01:04

## 2021-07-14 RX ADMIN — BACLOFEN 5 MCG: 500 INJECTION, SOLUTION INTRATHECAL at 11:33

## 2021-07-14 RX ADMIN — CIPROFLOXACIN 400 MG: 2 INJECTION, SOLUTION INTRAVENOUS at 17:32

## 2021-07-14 RX ADMIN — SUMATRIPTAN SUCCINATE 100 MG: 50 TABLET ORAL at 05:46

## 2021-07-14 RX ADMIN — NADOLOL 40 MG: 40 TABLET ORAL at 08:42

## 2021-07-14 ASSESSMENT — PAIN DESCRIPTION - PROGRESSION: CLINICAL_PROGRESSION: GRADUALLY WORSENING

## 2021-07-14 ASSESSMENT — PAIN DESCRIPTION - PAIN TYPE: TYPE: ACUTE PAIN

## 2021-07-14 ASSESSMENT — PAIN DESCRIPTION - LOCATION: LOCATION: ABDOMEN

## 2021-07-14 ASSESSMENT — PAIN SCALES - GENERAL
PAINLEVEL_OUTOF10: 7
PAINLEVEL_OUTOF10: 6
PAINLEVEL_OUTOF10: 0
PAINLEVEL_OUTOF10: 5
PAINLEVEL_OUTOF10: 3
PAINLEVEL_OUTOF10: 10
PAINLEVEL_OUTOF10: 6
PAINLEVEL_OUTOF10: 3

## 2021-07-14 ASSESSMENT — PAIN DESCRIPTION - ONSET: ONSET: ON-GOING

## 2021-07-14 ASSESSMENT — PAIN DESCRIPTION - ORIENTATION: ORIENTATION: MID;LOWER

## 2021-07-14 ASSESSMENT — PAIN DESCRIPTION - FREQUENCY: FREQUENCY: CONTINUOUS

## 2021-07-14 ASSESSMENT — PAIN - FUNCTIONAL ASSESSMENT: PAIN_FUNCTIONAL_ASSESSMENT: PREVENTS OR INTERFERES SOME ACTIVE ACTIVITIES AND ADLS

## 2021-07-14 ASSESSMENT — PAIN DESCRIPTION - DESCRIPTORS: DESCRIPTORS: CRAMPING

## 2021-07-14 NOTE — H&P
Hospital Medicine History & Physical      PCP: Familia Fabian    Date of Admission: 7/13/2021    Date of Service: Pt seen/examined on 7/13/2021 and Admitted to Inpatient with expected LOS greater than two midnights due to medical therapy. Chief Complaint:  Diarrhea      History Of Present Illness     59 y.o. female presents with a 3 week history of intermittent diarrhea. She states the episodes have been becoming more frequent. She denies fever, chills, recent antibiotic use, new medication, similar sick contacts. Stools have been watery, non-mucoid, denies black/bloody stools with loose stools becoming bilious lately along with development of RLQ abdominal cramping. Past Medical History:          Diagnosis Date    Depression     Hypertension     IBS (irritable bowel syndrome)     Migraines     RA (rheumatoid arthritis) (HCC)     RSD (reflex sympathetic dystrophy)        Past Surgical History:          Procedure Laterality Date    BACLOFEN PUMP IMPLANTATION Left     Pain pump    KNEE SURGERY Right     Arthroscopy / severed a nerve    NERVE SURGERY Right     Nerve clipping Rt leg above knee    SYMPATHECTOMY      TONSILLECTOMY      UPPER GASTROINTESTINAL ENDOSCOPY  10/03/2016       Medications Prior to Admission:      Prior to Admission medications    Medication Sig Start Date End Date Taking?  Authorizing Provider   abatacept (ORENCIA) 250 MG injection Infuse 125 mg intravenously once a week   Yes Historical Provider, MD   vitamin D (CHOLECALCIFEROL) 25 MCG (1000 UT) TABS tablet Take 2,000 Units by mouth 2 times daily 4/8/21  Yes Historical Provider, MD   lisinopril (PRINIVIL;ZESTRIL) 5 MG tablet Take 2.5 mg by mouth daily If SBP > 130 4/8/21  Yes Historical Provider, MD   omeprazole (PRILOSEC) 40 MG delayed release capsule Take 40 mg by mouth daily   Yes Historical Provider, MD   tiZANidine (ZANAFLEX) 4 MG tablet Take 4 mg by mouth daily   Yes Historical Provider, MD   pravastatin (PRAVACHOL) 20 MG tablet Take 20 mg by mouth daily   Yes Historical Provider, MD   CLONIDINE HCL, ANALGESIA, EP 49.95 mcg by Intrathecal route daily Pain pump syringe 200.0 mcg / ml   Yes Historical Provider, MD   lubiprostone (AMITIZA) 8 MCG CAPS capsule Take 8 mcg by mouth 2 times daily (with meals)   Yes Historical Provider, MD   nadolol (CORGARD) 40 MG tablet Take 40 mg by mouth daily. Yes Historical Provider, MD   triamterene-hydrochlorothiazide (DYAZIDE) 37.5-25 MG per capsule Take 1 capsule by mouth every morning. Yes Historical Provider, MD   montelukast (SINGULAIR) 10 MG tablet Take 10 mg by mouth nightly. Yes Historical Provider, MD   SUMAtriptan (IMITREX) 100 MG tablet Take 100 mg by mouth once as needed for Migraine. Yes Historical Provider, MD   escitalopram (LEXAPRO) 10 MG tablet Take 10 mg by mouth daily. Yes Historical Provider, MD   HYDROmorphone (DILAUDID) 10 MG/ML injection 4.995 mg by Intrathecal route daily Pain pump Syringe 20.0mg/ ml   Yes Historical Provider, MD   baclofen (LIORESAL) 10 MG/20ML SOLN 149.85 mcg by Intrathecal route daily Pain pump syringe 600.0 mcg/ ml   Yes Historical Provider, MD       Allergies:  Latex and Pcn [penicillins]    Social History:           TOBACCO:   reports that she has never smoked. She has never used smokeless tobacco.  ETOH:   reports no history of alcohol use. Family History:               Problem Relation Age of Onset    Cancer Mother     Cancer Father     Heart Disease Brother     Cancer Maternal Grandmother     Cancer Maternal Grandfather        REVIEW OF SYSTEMS:   Pertinent positives as noted in the HPI. All other systems reviewed and negative. PHYSICAL EXAM PERFORMED:    /65   Pulse 67   Temp 98.8 °F (37.1 °C) (Oral)   Resp 18   Ht 5' 3.5\" (1.613 m)   Wt 156 lb (70.8 kg)   SpO2 97%   BMI 27.20 kg/m²     General appearance:  No apparent distress, appears stated age and cooperative.   HEENT:  Normal cephalic, atraumatic without obvious deformity. Pupils equal, round, and Extra ocular muscles intact. Conjunctivae/corneas clear. Neck: Supple, with full range of motion. No jugular venous distention. Trachea midline. Respiratory:  Normal respiratory effort. Clear to auscultation, bilaterally without Rales/Wheezes/Rhonchi. Cardiovascular:  Regular rate and rhythm with normal S1/S2 without murmurs, rubs or gallops. Abdomen: Soft, non-tender, non-distended with normal bowel sounds. Musculoskeletal:  No clubbing, cyanosis or edema bilaterally. Skin: Skin color, texture, turgor normal.  No rashes or lesions. Neurologic:   grossly non-focal.  Psychiatric:  Alert and oriented, thought content appropriate, normal insight         Labs:     Recent Labs     07/13/21  1432   WBC 4.4   HGB 14.0   HCT 40.8        Recent Labs     07/13/21  1432      K 3.2*      CO2 21   BUN 13   CREATININE 0.6   CALCIUM 9.7   PHOS 1.9*     Recent Labs     07/13/21  1432   AST 16   ALT 9*   BILITOT 0.8   ALKPHOS 79     No results for input(s): INR in the last 72 hours. No results for input(s): William Aguilar in the last 72 hours. Urinalysis:      Lab Results   Component Value Date    NITRU Negative 07/13/2021    WBCUA 36 04/03/2018    BACTERIA 1+ 04/03/2018    RBCUA 9 04/03/2018    BLOODU Negative 07/13/2021    SPECGRAV >1.030 07/13/2021    GLUCOSEU Negative 07/13/2021       Radiology:          CT ABDOMEN PELVIS W IV CONTRAST Additional Contrast? None   Final Result   The wall of the rectum and sigmoid appears thickened. Although the findings   may be artifactual due to collapse, findings concerning for an infectious or   inflammatory etiology. No evidence of obstruction. US GALLBLADDER RUQ   Final Result   Unremarkable right upper quadrant ultrasound.              ASSESSMENT:    Active Hospital Problems    Diagnosis Date Noted    Colitis [K52.9] 07/13/2021         PLAN:    1) colitis  - cipro/flagyl  - stool sent for pcr    2) RA/RSD  - continue patient's medications per infusion pump  - denies worsening of symptoms    3) Hypophosphatemia / kalemia  - IV replacements ordered      DVT Prophylaxis: lovenox  Diet: Diet NPO  Code Status: Full Code         Dispo - Mitesh Escobedo MD    Thank you Subha Verduzco for the opportunity to be involved in this patient's care. If you have any questions or concerns please feel free to contact me at [phone].

## 2021-07-14 NOTE — PROGRESS NOTES
Admitted patient to room 4257 from the Emergency Room with chills, diarrhea, and abdomen pain . VS recorded (see flowsheet). Patient's breathing regular and unlabored, denies pain. Oriented to room, call light, TV, phone, patient rights and responsibilities. Bed in lowest position and locked, exit alarm in place. Non-slip socks on. ID bracelet on and correct per pt verbally reporting name and date of birth. Call light within reach. Needed items within reach.

## 2021-07-14 NOTE — FLOWSHEET NOTE
07/14/21 1141   Encounter Summary   Services provided to: Patient   Referral/Consult From: Nurse   Continue Visiting   (Pt resting, AD docs given. GB 7/14)   Complexity of Encounter Low   Length of Encounter 15 minutes       Advance Care Planning     Advance Care Planning Inpatient Note  Spiritual Care Department    Today's Date: 7/14/2021  Unit: [facility]    Received request from IDT Member. Upon review of chart and communication with care team, patient's decision making abilities are not in question. Patientwas/were present in the room during visit. Goals of ACP Conversation:  Discuss advance care planning documents    Health Care Decision Makers:      Primary Decision Maker: Pelon Balbuena - Angelia - [phone]  Click here to complete [address] including selection of the Healthcare Decision Maker Relationship (ie \"Primary\")     Assessment:   self-initiated this visit w/the pt to assess AD/ACP needs per spiritual care consult. Pt presented as resting at this time.  briefly introduced role and provided pt w/AD documents. Pt did not explain AD documents at this time. Should pt wish to review/complete AD documents while inpatient, please contact spiritual care services. No other needs were expressed at this time. Interventions:  Deferred conversation as patient not interested in completing an advance directive at this time  Deferred due to pt resting.      Outcomes/Plan:  ACP Discussion: Postponed    Electronically signed by Merrick Bradshaw on 7/14/2021 at 11:42 AM

## 2021-07-14 NOTE — CARE COORDINATION
Advance Care Planning     Advance Care Planning Activator (Inpatient)  Conversation Note      Date of ACP Conversation: 7/14/2021     Conversation Conducted with: Patient with Decision Making Capacity    ACP Activator: Vijay Peralta Decision Maker:     Current Designated Health Care Decision Maker:     Primary Decision Maker: Juany Harry - Other - [phone]    Today we documented desired Decision Maker(s), who is (are) NOT Legal Next of [facility]. ACP documents are required for decision maker authority. Care Preferences    Ventilation: \"If you were in your present state of health and suddenly became very ill and were unable to breathe on your own, what would your preference be about the use of a ventilator (breathing machine) if it were available to you? \"      Would the patient desire the use of ventilator (breathing machine)?: unsure    \"If your health worsens and it becomes clear that your chance of recovery is unlikely, what would your preference be about the use of a ventilator (breathing machine) if it were available to you? \"     Would the patient desire the use of ventilator (breathing machine)?: No      Resuscitation  \"CPR works best to restart the heart when there is a sudden event, like a heart attack, in someone who is otherwise healthy. Unfortunately, CPR does not typically restart the heart for people who have serious health conditions or who are very sick. \"    \"In the event your heart stopped as a result of an underlying serious health condition, would you want attempts to be made to restart your heart (answer \"yes\" for attempt to resuscitate) or would you prefer a natural death (answer \"no\" for do not attempt to resuscitate)? \" yes       [] Yes   [] No   Educated Patient / Juliette Saucedo regarding differences between Advance Directives and portable DNR orders.     Length of ACP Conversation in minutes:  5 min    Conversation Outcomes:  [] ACP discussion completed  [] Existing advance directive reviewed with patient; no changes to patient's previously recorded wishes  [] New Advance Directive completed  [] Portable Do Not Rescitate prepared for Provider review and signature  [] POLST/POST/MOLST/MOST prepared for Provider review and signature      Follow-up plan:    [] Schedule follow-up conversation to continue planning  [] Referred individual to Provider for additional questions/concerns   [] Advised patient/agent/surrogate to review completed ACP document and update if needed with changes in condition, patient preferences or care setting    [] This note routed to one or more involved healthcare providers        Electronically signed by Makayla Grant RN on 7/14/21 at 10:17 AM EDT

## 2021-07-14 NOTE — PROGRESS NOTES
4 Eyes Skin Assessment     NAME:  Neema Ibanez OF BIRTH:  1960  MEDICAL RECORD NUMBER:  [id number]    The patient is being assess for  Admission    I agree that 2 RN's have performed a thorough Head to Toe Skin Assessment on the patient. ALL assessment sites listed below have been assessed. Areas assessed by both nurses:    Head, Face, Ears, Shoulders, Back, Chest, Arms, Elbows, Hands, Sacrum. Buttock, Coccyx, Ischium and Legs. Feet and Heels        Does the Patient have a Wound?  No noted wound(s)       Sabas Prevention initiated:  No   Wound Care Orders initiated:  No    Pressure Injury (Stage 3,4, Unstageable, DTI, NWPT, and Complex wounds) if present place consult order under [de-identified] No    New and Established Ostomies if present place consult order under : No      Nurse 1 eSignature: Electronically signed by Mike Dickson RN on 7/13/21 at 10:37 PM EDT    **SHARE this note so that the co-signing nurse is able to place an eSignature**    Nurse 2 eSignature: Electronically signed by Bruce Yu RN on 7/14/21 at 4:21 PM EDT

## 2021-07-14 NOTE — FLOWSHEET NOTE
Pt with episodes of bradycardia, HR as low as 40 BPM. Pt asymptomatic. Dr. Nhi Colón made aware: beta blocker put on hold.   Electronically signed by Сергей Sarkar RN on 7/14/2021 at 5:03 PM

## 2021-07-14 NOTE — CONSULTS
Gastroenterology Consult Note      Patient: Luis Davis  : 1960  Acct#:      Date:  2021    Subjective:       History of Present Illness    Patient is a 59 y.o.  female who is seen in consult for severe diarrhea. The patient has a past medical history significant for rheumatoid arthritis (++RF, rheumatoid arthritis. Diagnosed 3/19. Humira -. MTX -. Enbrel -. Actemra -. Dorna Patches started ),  Chronic constipation, complex regional pain syndrome, IBS, migraine headaches, hypertension, depression. She presents with a 1 month history of intermittent and severe watery diarrhea. She initially reported that these episodes would be associated with significant diaphoresis and flushing symptoms. They would typically last 12 hours. She denies any severe abdominal pain with these episodes. These typically occurred in the middle portion of the day and she specifically denies any night sweats. She reports that she has some diaphoresis with her RSD, but that this was way more pronounced than her typical diaphoretic episodes. She denies any weight loss. She denies any melena or hematochezia. She denies any osmotic pattern to her diarrhea, and has more of a secretory pattern with diffuse watery yellow stools. She reports that she had significant fatigue after these episodes of diarrhea. She denies any unintentional weight loss. She had a prior colonoscopy with Dr. Alexsandra Garza over 10 years ago. She has no family history of colitis or colon cancer. She reports that the frequency of her episodes were occurring almost daily. She reported persistent diarrhea for the last several days. She did endorse some mild right-sided abdominal discomfort in the last day or so. The patient did report a change in her medication for her rheumatoid arthritis. She had been started on Orencia 2 months ago.   She also reports a significant infection in her perineal area in April after an intrathecal baclofen pump exchange. She does take nadolol and Clonidine, singulair for RSD and hot flashes. She presented to the emergency department for further evaluation of these issues. Past Medical History:   Diagnosis Date    Depression     Hypertension     IBS (irritable bowel syndrome)     Migraines     RA (rheumatoid arthritis) (HCC)     RSD (reflex sympathetic dystrophy)       Past Surgical History:   Procedure Laterality Date    BACLOFEN PUMP IMPLANTATION Left     Pain pump    KNEE SURGERY Right     Arthroscopy / severed a nerve    NERVE SURGERY Right     Nerve clipping Rt leg above knee    SYMPATHECTOMY      TONSILLECTOMY      UPPER GASTROINTESTINAL ENDOSCOPY  10/03/2016      Past Endoscopic History: EGD 2016. Remote colonoscopy >10 years ago. Admission Meds  No current facility-administered medications on file prior to encounter. Current Outpatient Medications on File Prior to Encounter   Medication Sig Dispense Refill    abatacept (ORENCIA) 250 MG injection Infuse 125 mg intravenously once a week      vitamin D (CHOLECALCIFEROL) 25 MCG (1000 UT) TABS tablet Take 2,000 Units by mouth 2 times daily      lisinopril (PRINIVIL;ZESTRIL) 5 MG tablet Take 2.5 mg by mouth daily If SBP > 130      omeprazole (PRILOSEC) 40 MG delayed release capsule Take 40 mg by mouth daily      tiZANidine (ZANAFLEX) 4 MG tablet Take 4 mg by mouth daily      pravastatin (PRAVACHOL) 20 MG tablet Take 20 mg by mouth daily      CLONIDINE HCL, ANALGESIA, EP 49.95 mcg by Intrathecal route daily Pain pump syringe 200.0 mcg / ml      lubiprostone (AMITIZA) 8 MCG CAPS capsule Take 8 mcg by mouth 2 times daily (with meals)      nadolol (CORGARD) 40 MG tablet Take 40 mg by mouth daily.  triamterene-hydrochlorothiazide (DYAZIDE) 37.5-25 MG per capsule Take 1 capsule by mouth every morning.  montelukast (SINGULAIR) 10 MG tablet Take 10 mg by mouth nightly.       SUMAtriptan (IMITREX) 100 MG tablet Take 100 mg by mouth once as needed for Migraine.  HYDROmorphone (DILAUDID) 10 MG/ML injection 4.995 mg by Intrathecal route daily Pain pump Syringe 20.0mg/ ml      baclofen (LIORESAL) 10 MG/20ML SOLN 149.85 mcg by Intrathecal route daily Pain pump syringe 600.0 mcg/ ml      escitalopram (LEXAPRO) 10 MG tablet Take 10 mg by mouth daily. Allergies  Allergies   Allergen Reactions    Latex Rash    Pcn [Penicillins]         Social history:  Social History     Tobacco Use    Smoking status: Never Smoker    Smokeless tobacco: Never Used   Substance Use Topics    Alcohol use: No        Family History:   Family History   Problem Relation Age of Onset    Cancer Mother     Cancer Father     Heart Disease Brother     Cancer Maternal Grandmother     Cancer Maternal Grandfather           Review of Systems  Pertinent items are noted in HPI. Physical Exam:  Blood pressure (!) 163/79, pulse 74, temperature 98.7 °F (37.1 °C), temperature source Oral, resp. rate 16, height 5' 3\" (1.6 m), weight 158 lb 1.1 oz (71.7 kg), SpO2 96 %, not currently breastfeeding. General appearance: alert, cooperative, no distress, appears stated age  Eyes: Anicteric  Head: Normocephalic, without obvious abnormality  Lungs: clear to auscultation bilaterally, Normal Effort  Heart: regular rate and rhythm, normal S1 and S2, no murmurs or rubs  Abdomen: soft, non-tender. Bowel sounds normal. No masses,  no organomegaly.    Extremities: atraumatic, no cyanosis or edema  Skin: warm and dry, no jaundice  Neuro: Grossly intact, A&OX3      Data Review:    Recent Labs     07/13/21  1432 07/14/21  0732   WBC 4.4 3.8*   HGB 14.0 12.1   HCT 40.8 34.7*   MCV 79.6* 80.9    123*     Recent Labs     07/13/21 1432 07/14/21  0732    140   K 3.2* 3.4*    109   CO2 21 23   PHOS 1.9*  --    BUN 13 7   CREATININE 0.6 0.7     Recent Labs     07/13/21 1432 07/14/21  0732   AST 16 14* ALT 9* 7*   BILIDIR  --  <0.2   BILITOT 0.8 0.5   ALKPHOS 79 58     Recent Labs     07/13/21  1432   LIPASE 25.0     No results for input(s): PROTIME, INR in the last 72 hours. No results for input(s): PTT in the last 72 hours. No results for input(s): OCCULTBLD in the last 72 hours. Imaging Studies:    CT ABDOMEN PELVIS W IV CONTRAST Additional Contrast? None   Final Result   The wall of the rectum and sigmoid appears thickened. Although the findings   may be artifactual due to collapse, findings concerning for an infectious or   inflammatory etiology. No evidence of obstruction. US GALLBLADDER RUQ   Final Result   Unremarkable right upper quadrant ultrasound. Assessment:     1) Acute on Chronic secretory diarrhea- Seems to have had progressive symptoms over 1 month but acutely worsened in past several days. Large voluminous yellow BM reported without much pain. Associated flushing and diaphoresis reported. Dorna Patches started 2 months ago, so ? Correlation. Patient with antibiotics in 4/2021 for perineal infection after pain pump exchange. Patient also with RSD and hx of flushing (on clonidine, nadolol, and singulair). Patient is on SSRI and statin that can be associated with microscopic colitis. CT with some sigmoid and rectal wall thickening but she has no pattern of diarrhea consistent with colitis.   No clinical symptoms of serotonin syndrome    2) Non-specific colonic wall thickening    Recommendations:   1)Will complete infectious stool pathogens workup  2) Fecal leukocytes   3) Check serologic workup for secretory diarrhea (Pancreatic polypeptide, Chromogranin A, Tryptase, Urinary 5HIAA,  VIP, gastrin, glucagon, somatostatin, calcitonin,  4) Check CRP, ESR, TSH, free T4, TTG IgA, immunoglobulins  5) Patient may need EGD and colonoscopy set up as outpatient  6) Hold amitiza  7) Monitor I and O's  8) Daily Renal  9) Cipro and flagyl for possible enterocolitis. 10) Diet as tolerated. Thank you for allowing me to participate in the care of your patient. Please feel free to contact me with any questions or concerns.      Mylene Leon DO  [address] and [address]

## 2021-07-14 NOTE — CARE COORDINATION
INITIAL CASE MANAGEMENT ASSESSMENT    Reviewed chart, met with patient to assess possible discharge needs. Explained Case Management role/services. Living Situation: verified address, lives in a 2 story condo with a basement, 2 [address]    ADLs: independent     DME: walker, cane, shower chair    PT/OT Recs: na     Active Services: none     Transportation: active , family will be able to take her home at dc     Medications: no barriers, uses [facility]    PCP: Miguel Garduno      HD/PD: na    PLAN/COMMENTS: denies any needs, plan is to return home at dc    CM provided contact information for patient or family to call with any questions. CM will follow and assist as needed.     Marina Packer RN, BSN,   [phone]    Electronically signed by Marina Packer RN on 7/14/2021 at 10:14 AM

## 2021-07-14 NOTE — FLOWSHEET NOTE
Pain pump noted to L side of abdomen. Pt requesting to give herself a \"bolus. \" Pt provides a control to do so. Dr. Guadalupe Rene made aware. OK to allow pt to give herself boluses as she would at home. Will monitor.   Electronically signed by Cammie Ponce RN on 7/14/2021 at 5:05 PM

## 2021-07-14 NOTE — ED NOTES
Report called to  Marva Delgado      RN   To Room  4257  Cardiac monitor on during transfer  Pt's pain level   denies  VSS, Afebrile   IV site is clean, dry and intact, Normal saline locked   Family updated on transfer            Kevin Holden RN  07/13/21 2053

## 2021-07-15 LAB
GI BACTERIAL PATHOGENS BY PCR: NORMAL
IGA: 130 MG/DL (ref 70–400)
IGG: 1020 MG/DL (ref 700–1600)
IGM: 101 MG/DL (ref 40–230)
TISSUE TRANSGLUTAMINASE IGA: <0.5 U/ML (ref 0–14)

## 2021-07-15 PROCEDURE — 6370000000 HC RX 637 (ALT 250 FOR IP): Performed by: HOSPITALIST

## 2021-07-15 PROCEDURE — 2500000003 HC RX 250 WO HCPCS: Performed by: HOSPITALIST

## 2021-07-15 PROCEDURE — 1200000000 HC SEMI PRIVATE

## 2021-07-15 PROCEDURE — 6360000002 HC RX W HCPCS: Performed by: HOSPITALIST

## 2021-07-15 PROCEDURE — 2580000003 HC RX 258: Performed by: HOSPITALIST

## 2021-07-15 PROCEDURE — 99222 1ST HOSP IP/OBS MODERATE 55: CPT | Performed by: INTERNAL MEDICINE

## 2021-07-15 PROCEDURE — 6370000000 HC RX 637 (ALT 250 FOR IP): Performed by: NURSE PRACTITIONER

## 2021-07-15 PROCEDURE — 6370000000 HC RX 637 (ALT 250 FOR IP): Performed by: PHYSICIAN ASSISTANT

## 2021-07-15 PROCEDURE — 6370000000 HC RX 637 (ALT 250 FOR IP): Performed by: INTERNAL MEDICINE

## 2021-07-15 RX ORDER — POTASSIUM CHLORIDE 750 MG/1
40 TABLET, FILM COATED, EXTENDED RELEASE ORAL 2 TIMES DAILY
Status: COMPLETED | OUTPATIENT
Start: 2021-07-15 | End: 2021-07-16

## 2021-07-15 RX ADMIN — MONTELUKAST 10 MG: 10 TABLET, FILM COATED ORAL at 20:43

## 2021-07-15 RX ADMIN — POLYETHYLENE GLYCOL 3350, SODIUM SULFATE ANHYDROUS, SODIUM BICARBONATE, SODIUM CHLORIDE, POTASSIUM CHLORIDE 4000 ML: 236; 22.74; 6.74; 5.86; 2.97 POWDER, FOR SOLUTION ORAL at 16:35

## 2021-07-15 RX ADMIN — ONDANSETRON 4 MG: 2 INJECTION INTRAMUSCULAR; INTRAVENOUS at 05:02

## 2021-07-15 RX ADMIN — CIPROFLOXACIN 400 MG: 2 INJECTION, SOLUTION INTRAVENOUS at 17:42

## 2021-07-15 RX ADMIN — ACETAMINOPHEN 650 MG: 325 TABLET ORAL at 05:02

## 2021-07-15 RX ADMIN — POTASSIUM CHLORIDE 40 MEQ: 750 TABLET, FILM COATED, EXTENDED RELEASE ORAL at 15:47

## 2021-07-15 RX ADMIN — PRAVASTATIN SODIUM 20 MG: 10 TABLET ORAL at 20:36

## 2021-07-15 RX ADMIN — DICYCLOMINE HYDROCHLORIDE 20 MG: 10 CAPSULE ORAL at 15:47

## 2021-07-15 RX ADMIN — POTASSIUM CHLORIDE 40 MEQ: 750 TABLET, FILM COATED, EXTENDED RELEASE ORAL at 20:36

## 2021-07-15 RX ADMIN — CIPROFLOXACIN 400 MG: 2 INJECTION, SOLUTION INTRAVENOUS at 05:02

## 2021-07-15 RX ADMIN — Medication 2000 UNITS: at 08:29

## 2021-07-15 RX ADMIN — METRONIDAZOLE 500 MG: 500 INJECTION, SOLUTION INTRAVENOUS at 03:59

## 2021-07-15 RX ADMIN — ESCITALOPRAM OXALATE 10 MG: 10 TABLET ORAL at 08:29

## 2021-07-15 RX ADMIN — ENOXAPARIN SODIUM 40 MG: 40 INJECTION SUBCUTANEOUS at 08:29

## 2021-07-15 RX ADMIN — DICYCLOMINE HYDROCHLORIDE 20 MG: 10 CAPSULE ORAL at 05:01

## 2021-07-15 RX ADMIN — BACLOFEN 5 MCG: 500 INJECTION, SOLUTION INTRATHECAL at 08:29

## 2021-07-15 RX ADMIN — ONDANSETRON 4 MG: 2 INJECTION INTRAMUSCULAR; INTRAVENOUS at 20:35

## 2021-07-15 RX ADMIN — SODIUM CHLORIDE: 9 INJECTION, SOLUTION INTRAVENOUS at 03:59

## 2021-07-15 RX ADMIN — Medication 2000 UNITS: at 20:36

## 2021-07-15 RX ADMIN — METRONIDAZOLE 500 MG: 500 INJECTION, SOLUTION INTRAVENOUS at 12:47

## 2021-07-15 RX ADMIN — SODIUM CHLORIDE, PRESERVATIVE FREE 10 ML: 5 INJECTION INTRAVENOUS at 20:36

## 2021-07-15 RX ADMIN — PANTOPRAZOLE SODIUM 40 MG: 40 TABLET, DELAYED RELEASE ORAL at 05:01

## 2021-07-15 RX ADMIN — TIZANIDINE 4 MG: 4 TABLET ORAL at 08:29

## 2021-07-15 RX ADMIN — METRONIDAZOLE 500 MG: 500 INJECTION, SOLUTION INTRAVENOUS at 20:36

## 2021-07-15 RX ADMIN — DICYCLOMINE HYDROCHLORIDE 20 MG: 10 CAPSULE ORAL at 12:47

## 2021-07-15 ASSESSMENT — PAIN DESCRIPTION - DESCRIPTORS
DESCRIPTORS: HEADACHE
DESCRIPTORS: ACHING;DISCOMFORT

## 2021-07-15 ASSESSMENT — PAIN SCALES - GENERAL
PAINLEVEL_OUTOF10: 3
PAINLEVEL_OUTOF10: 3
PAINLEVEL_OUTOF10: 6

## 2021-07-15 ASSESSMENT — PAIN DESCRIPTION - PAIN TYPE
TYPE: ACUTE PAIN
TYPE: ACUTE PAIN

## 2021-07-15 ASSESSMENT — PAIN DESCRIPTION - ONSET
ONSET: ON-GOING
ONSET: ON-GOING

## 2021-07-15 ASSESSMENT — PAIN DESCRIPTION - FREQUENCY
FREQUENCY: INTERMITTENT
FREQUENCY: CONTINUOUS

## 2021-07-15 ASSESSMENT — PAIN - FUNCTIONAL ASSESSMENT
PAIN_FUNCTIONAL_ASSESSMENT: ACTIVITIES ARE NOT PREVENTED
PAIN_FUNCTIONAL_ASSESSMENT: ACTIVITIES ARE NOT PREVENTED

## 2021-07-15 ASSESSMENT — PAIN DESCRIPTION - ORIENTATION
ORIENTATION: RIGHT;LEFT
ORIENTATION: RIGHT;LEFT

## 2021-07-15 ASSESSMENT — PAIN DESCRIPTION - LOCATION
LOCATION: HEAD
LOCATION: GENERALIZED

## 2021-07-15 ASSESSMENT — PAIN DESCRIPTION - PROGRESSION
CLINICAL_PROGRESSION: NOT CHANGED
CLINICAL_PROGRESSION: NOT CHANGED

## 2021-07-15 NOTE — PLAN OF CARE
Problem: Falls - Risk of:  Goal: Will remain free from falls  Description: Will remain free from falls  7/15/2021 1030 by Irving Najera RN  Outcome: Ongoing  7/14/2021 2251 by Shelley Tian RN  Outcome: Ongoing  Goal: Absence of physical injury  Description: Absence of physical injury  7/15/2021 1030 by Irving Najera RN  Outcome: Ongoing  7/14/2021 2251 by Shelley Tian RN  Outcome: Ongoing

## 2021-07-15 NOTE — PROGRESS NOTES
Hospitalist Progress Note      PCP: Robert Lennon    Date of Admission: 7/13/2021    Subjective: feels diarrhea improved, no nausea, tolerating clears    Medications:  Reviewed    Infusion Medications    sodium chloride       Scheduled Medications    potassium chloride  40 mEq Oral BID    dicyclomine  20 mg Oral TID AC    metroNIDAZOLE  500 mg Intravenous Q8H    ciprofloxacin  400 mg Intravenous Q12H    baclofen  5 mcg Intrathecal Daily    escitalopram  10 mg Oral Daily    montelukast  10 mg Oral Nightly    [Held by provider] nadolol  40 mg Oral Daily    pantoprazole  40 mg Oral QAM AC    pravastatin  20 mg Oral Nightly    tiZANidine  4 mg Oral Daily    Vitamin D  2,000 Units Oral BID    sodium chloride flush  5-40 mL Intravenous 2 times per day    enoxaparin  40 mg Subcutaneous Daily     PRN Meds: simethicone, sodium chloride flush, sodium chloride, ondansetron **OR** ondansetron, polyethylene glycol, acetaminophen **OR** acetaminophen, potassium chloride      Intake/Output Summary (Last 24 hours) at 7/15/2021 1857  Last data filed at 7/15/2021 1347  Gross per 24 hour   Intake 2416.67 ml   Output 0 ml   Net 2416.67 ml       Physical Exam Performed:    BP (!) 161/73   Pulse (!) 49   Temp 98.5 °F (36.9 °C) (Oral)   Resp 16   Ht 5' 3\" (1.6 m)   Wt 156 lb 8.4 oz (71 kg)   SpO2 96%   BMI 27.73 kg/m²        General appearance:  No apparent distress, appears stated age and cooperative. HEENT:  Normal cephalic, atraumatic without obvious deformity. Pupils equal, round, and Extra ocular muscles intact. Conjunctivae/corneas clear. Neck: Supple, with full range of motion. No jugular venous distention. Trachea midline. Respiratory:  Normal respiratory effort. Clear to auscultation, bilaterally without Rales/Wheezes/Rhonchi. Cardiovascular:  Regular rate and rhythm with normal S1/S2 without murmurs, rubs or gallops.   Abdomen: Soft, non-tender, non-distended with normal bowel sounds. Musculoskeletal:  No clubbing, cyanosis or edema bilaterally.     Skin: Skin color, texture, turgor normal.  No rashes or lesions. Neurologic:   grossly non-focal.  Psychiatric:  Alert and oriented, thought content appropriate, normal insight       Labs:   Recent Labs     07/13/21  1432 07/14/21  0732   WBC 4.4 3.8*   HGB 14.0 12.1   HCT 40.8 34.7*    123*     Recent Labs     07/13/21  1432 07/14/21  0732    140   K 3.2* 3.4*    109   CO2 21 23   BUN 13 7   CREATININE 0.6 0.7   CALCIUM 9.7 8.4   PHOS 1.9*  --      Recent Labs     07/13/21  1432 07/14/21  0732   AST 16 14*   ALT 9* 7*   BILIDIR  --  <0.2   BILITOT 0.8 0.5   ALKPHOS 79 58     No results for input(s): INR in the last 72 hours. No results for input(s): Silvino Canones in the last 72 hours. Urinalysis:      Lab Results   Component Value Date    NITRU Negative 07/13/2021    WBCUA 36 04/03/2018    BACTERIA 1+ 04/03/2018    RBCUA 9 04/03/2018    BLOODU Negative 07/13/2021    SPECGRAV >1.030 07/13/2021    GLUCOSEU Negative 07/13/2021       Radiology:  CT ABDOMEN PELVIS W IV CONTRAST Additional Contrast? None   Final Result   The wall of the rectum and sigmoid appears thickened. Although the findings   may be artifactual due to collapse, findings concerning for an infectious or   inflammatory etiology. No evidence of obstruction. US GALLBLADDER RUQ   Final Result   Unremarkable right upper quadrant ultrasound. Assessment/Plan:    Active Hospital Problems    Diagnosis     Colitis [K52.9]             1. Acute colitis  - suspect infectious - probably bacterial  - CT abd reviewed  - cont cipro/flagyl  - stool sent for work-up  - consulted GI  - plan EGD/colon in am     2. RA/RSD  - continue patient's medications per infusion pump  - denies worsening of symptoms     3. Hypophosphatemia / hypokalemia  - replete     4. Bradycardia - hold nadolol, monitor on tele.  Pt is asymptomatic, Cardio consulted        DVT Prophylaxis: lovenox  Diet: ADULT DIET;  Clear Liquid  Diet NPO  Code Status: Full Code    PT/OT Eval Status: ordered    Angle Wooten MD

## 2021-07-15 NOTE — PROGRESS NOTES
Hospitalist Progress Note      PCP: Santino Ken    Date of Admission: 7/13/2021    Subjective: still with diarrhea, on pain pump with remote    Medications:  Reviewed    Infusion Medications    sodium chloride      sodium chloride 100 mL/hr at 07/14/21 1553     Scheduled Medications    dicyclomine  20 mg Oral TID AC    metroNIDAZOLE  500 mg Intravenous Q8H    ciprofloxacin  400 mg Intravenous Q12H    baclofen  5 mcg Intrathecal Daily    escitalopram  10 mg Oral Daily    montelukast  10 mg Oral Nightly    [Held by provider] nadolol  40 mg Oral Daily    pantoprazole  40 mg Oral QAM AC    pravastatin  20 mg Oral Nightly    tiZANidine  4 mg Oral Daily    Vitamin D  2,000 Units Oral BID    sodium chloride flush  5-40 mL Intravenous 2 times per day    enoxaparin  40 mg Subcutaneous Daily     PRN Meds: simethicone, sodium chloride flush, sodium chloride, ondansetron **OR** ondansetron, polyethylene glycol, acetaminophen **OR** acetaminophen, potassium chloride      Intake/Output Summary (Last 24 hours) at 7/14/2021 2026  Last data filed at 7/14/2021 1923  Gross per 24 hour   Intake 2791.67 ml   Output 3300 ml   Net -508.33 ml       Physical Exam Performed:    BP (!) 111/59   Pulse 50   Temp 97.8 °F (36.6 °C) (Oral)   Resp 18   Ht 5' 3\" (1.6 m)   Wt 158 lb 1.1 oz (71.7 kg)   SpO2 95%   BMI 28.00 kg/m²        General appearance:  No apparent distress, appears stated age and cooperative. HEENT:  Normal cephalic, atraumatic without obvious deformity. Pupils equal, round, and Extra ocular muscles intact. Conjunctivae/corneas clear. Neck: Supple, with full range of motion. No jugular venous distention. Trachea midline. Respiratory:  Normal respiratory effort. Clear to auscultation, bilaterally without Rales/Wheezes/Rhonchi. Cardiovascular:  Regular rate and rhythm with normal S1/S2 without murmurs, rubs or gallops.   Abdomen: Soft, non-tender, non-distended with normal bowel sounds. Musculoskeletal:  No clubbing, cyanosis or edema bilaterally. Skin: Skin color, texture, turgor normal.  No rashes or lesions. Neurologic:   grossly non-focal.  Psychiatric:  Alert and oriented, thought content appropriate, normal insight         Labs:   Recent Labs     07/13/21  1432 07/14/21  0732   WBC 4.4 3.8*   HGB 14.0 12.1   HCT 40.8 34.7*    123*     Recent Labs     07/13/21  1432 07/14/21  0732    140   K 3.2* 3.4*    109   CO2 21 23   BUN 13 7   CREATININE 0.6 0.7   CALCIUM 9.7 8.4   PHOS 1.9*  --      Recent Labs     07/13/21  1432 07/14/21  0732   AST 16 14*   ALT 9* 7*   BILIDIR  --  <0.2   BILITOT 0.8 0.5   ALKPHOS 79 58     No results for input(s): INR in the last 72 hours. No results for input(s): Jadene Moh in the last 72 hours. Urinalysis:      Lab Results   Component Value Date    NITRU Negative 07/13/2021    WBCUA 36 04/03/2018    BACTERIA 1+ 04/03/2018    RBCUA 9 04/03/2018    BLOODU Negative 07/13/2021    SPECGRAV >1.030 07/13/2021    GLUCOSEU Negative 07/13/2021       Radiology:  CT ABDOMEN PELVIS W IV CONTRAST Additional Contrast? None   Final Result   The wall of the rectum and sigmoid appears thickened. Although the findings   may be artifactual due to collapse, findings concerning for an infectious or   inflammatory etiology. No evidence of obstruction. US GALLBLADDER RUQ   Final Result   Unremarkable right upper quadrant ultrasound. Assessment/Plan:    Active Hospital Problems    Diagnosis     Colitis [K52.9]          1. Acute colitis  - suspect infectious - probably bacterial  - CT abd reviewed  - cont cipro/flagyl  - stool sent for work-up  - will consult GI     2. RA/RSD  - continue patient's medications per infusion pump  - denies worsening of symptoms     3. Hypophosphatemia / hypokalemia  - IV replacements ordered    4.  Bradycardia - hold nadolol, monitor on tele        DVT Prophylaxis: lovenox  Diet: ADULT DIET; Clear Liquid  Code Status: Full Code    PT/OT Eval Status: ordered    Amado Rabago MD

## 2021-07-15 NOTE — PROGRESS NOTES
INPATIENT PROGRESS NOTE        IDENTIFYING DATA/REASON FOR CONSULTATION   PATIENT:  Jenn Polk  MRN:  [id number]  ADMIT DATE: 2021  TIME OF EVALUATION: 7/15/2021 11:14 AM  HOSPITAL STAY:   LOS: 2 days   CONSULTING PHYSICIAN: Ana Kessler MD   REASON FOR CONSULTATION: diarrhea    Subjective:    Patient seen in follow up for diarrhea. She has not had any further stool output since admission. She states her stomach is \"gurgling\"  She is hesitant to eat due to the gurgling       MEDICATIONS   SCHEDULED:  dicyclomine, 20 mg, TID AC  metroNIDAZOLE, 500 mg, Q8H  ciprofloxacin, 400 mg, Q12H  baclofen, 5 mcg, Daily  escitalopram, 10 mg, Daily  montelukast, 10 mg, Nightly  [Held by provider] nadolol, 40 mg, Daily  pantoprazole, 40 mg, QAM AC  pravastatin, 20 mg, Nightly  tiZANidine, 4 mg, Daily  Vitamin D, 2,000 Units, BID  sodium chloride flush, 5-40 mL, 2 times per day  enoxaparin, 40 mg, Daily      FLUIDS/DRIPS:     sodium chloride      sodium chloride 100 mL/hr at 07/15/21 0359     PRNs: simethicone, 80 mg, Q6H PRN  sodium chloride flush, 5-40 mL, PRN  sodium chloride, 25 mL, PRN  ondansetron, 4 mg, Q8H PRN   Or  ondansetron, 4 mg, Q6H PRN  polyethylene glycol, 17 g, Daily PRN  acetaminophen, 650 mg, Q6H PRN   Or  acetaminophen, 650 mg, Q6H PRN  potassium chloride, 10 mEq, PRN      ALLERGIES:    Allergies   Allergen Reactions    Latex Rash    Pcn [Penicillins]          PHYSICAL EXAM   VITALS:  BP (!) 146/68   Pulse (!) 46   Temp 98.1 °F (36.7 °C) (Oral)   Resp 16   Ht 5' 3\" (1.6 m)   Wt 156 lb 8.4 oz (71 kg)   SpO2 94%   BMI 27.73 kg/m²   TEMPERATURE:  Current - Temp: 98.1 °F (36.7 °C);  Max - Temp  Av °F (36.7 °C)  Min: 97.8 °F (36.6 °C)  Max: 98.4 °F (36.9 °C)    Physical Exam:  General appearance: alert, cooperative, no distress  Eyes: Anicteric  Head: Normocephalic, without obvious abnormality  Lungs: clear to auscultation bilaterally, Normal Effort  Heart: regular rate and rhythm, tomorrow  Clear liquid diet today, npo after MN    If you have any questions or need any further information, please feel free to contact us [phone]. Thank you for allowing us to participate in the care of Jenn Salinas. The note was completed using Dragon voice recognition transcription. Every effort was made to ensure accuracy; however, inadvertent transcription errors may be present despite my best efforts to edit errors. Roberto COX    Attending physician addendum:      I have personally seen and examined the patient, reviewed the patient's medical record and pertinent labs and clinical imaging. I have personally staffed the case with BROOKE Cartagena. I agree with her consultation note, exam findings, assessment and plans  as written above. I have made appropriate modifications and edited her assessment and plan   where needed to reflect my impression and plans for this patient. Patient with no further diarrhea since admission. Secretory diarrhea prior to admission. Some leukopenia noted. ? Viral GE. Colonic wall thickening on CT noted    BP (!) 106/57   Pulse 72   Temp 97.6 °F (36.4 °C) (Oral)   Resp 16   Ht 5' 3\" (1.6 m)   Wt 156 lb 8.4 oz (71 kg)   SpO2 98%   BMI 27.73 kg/m²      Gen- NAD  CV- RRR  Lungs- CTAB  Abd- soft, NT, ND    Labs and imaging reviewed    Impression  1) Acute on chronic secretory type diarrhea-  ? Acute enteritis. Secretory workup sent off. Plan:  Follow up on secretory diarrhea workup  EGD and colonoscopy in am      Thank you for allowing me to participate in this patient's care. If there are any questions or concerns regarding this patient, or the plan we have set in place, please feel free to contact me at [phone].      Migdalia Lugo, DO

## 2021-07-15 NOTE — PLAN OF CARE
with PRN analgesics per MD order. Pt able to express presence and absence of pain and rate pain appropriately using numerical scale       Problem: Discharge Planning:  Goal: Patients continuum of care needs are met  Description: Patients continuum of care needs are met  7/14/2021 2251 by Nikita Ross RN  Outcome: Ongoing  7/14/2021 0905 by Sharon Melo RN  Outcome: Ongoing  Note: Continuing to work with patient and health care team on discharge plan. Discharge instructions and medication management will be reviewed prior to discharge.        Problem: Skin Integrity:  Goal: Will show no infection signs and symptoms  Description: Will show no infection signs and symptoms  Outcome: Ongoing  Goal: Absence of new skin breakdown  Description: Absence of new skin breakdown  Outcome: Ongoing

## 2021-07-15 NOTE — CONSULTS
Cardiac Electrophysiology Consultation   Date: 7/15/2021  Admit Date:  7/13/2021  Reason for Consultation: sinus bradycardia 37-40 bpm  Consult Requesting Physician: Ida Bassett MD     Chief Complaint   Patient presents with    Fatigue     Cold sweats for about 3 weeks, having weakness, and now loose stools since Friday. Saw PCP yesterday and she was sent for blood work.  Diarrhea     HPI: Grace Stein is a 59 y.o. female h/o reflex sympathetic dystrophy (for which she has an internal abdominal pump that slow-releases baclofen, clonidine, and dilaudid) and rheumatoid arthritis who presents with intractable diarrhea for the last 1 month which worsened in persistence in the last 4 days. Last week Thursday, the patient had an increase in her dilaudid and there has not been any further HR evaluation since then. Upon workup, EKG shows sinus bradycardia 49 bpm without ST-T wave changes nor QTc prolongation. On telemetry, she is found to have sinus bradycardia even as slow as 37 bpm while resting in bed. The patient does not endorse any symptoms attributable to the heart rate. She is fatigued, but attributes that to her diarrhea. Past Medical History:   Diagnosis Date    Depression     Hypertension     IBS (irritable bowel syndrome)     Migraines     RA (rheumatoid arthritis) (HCC)     RSD (reflex sympathetic dystrophy)         Past Surgical History:   Procedure Laterality Date    BACLOFEN PUMP IMPLANTATION Left     Pain pump    KNEE SURGERY Right     Arthroscopy / severed a nerve    NERVE SURGERY Right     Nerve clipping Rt leg above knee    SYMPATHECTOMY      TONSILLECTOMY      UPPER GASTROINTESTINAL ENDOSCOPY  10/03/2016       Allergies   Allergen Reactions    Latex Rash    Pcn [Penicillins]        Social History:  Reviewed. reports that she has never smoked. She has never used smokeless tobacco. She reports that she does not drink alcohol and does not use drugs.      Family History:  Reviewed. family history includes Cancer in her father, maternal grandfather, maternal grandmother, and mother; Heart Disease in her brother. No premature CAD. Review of System:  All other systems reviewed except for that noted above. Pertinent negatives and positives are:     · General: negative for fever, chills   · Ophthalmic ROS: negative for - eye pain or loss of vision  · ENT ROS: negative for - headaches, sore throat   · Respiratory: negative for - cough, sputum  · Cardiovascular: Reviewed in HPI  · Gastrointestinal: negative for - abdominal pain, diarrhea, N/V  · Hematology: negative for - bleeding, blood clots, bruising or jaundice  · Genito-Urinary:  negative for - Dysuria or incontinence  · Musculoskeletal: negative for - Joint swelling, muscle pain  · Neurological: negative for - confusion, dizziness, headaches   · Psychiatric: No anxiety, no depression. · Dermatological: negative for - rash    Physical Examination:  Vitals:    07/15/21 1431   BP: (!) 106/57   Pulse: 72   Resp: 16   Temp: 97.6 °F (36.4 °C)   SpO2: 98%        Intake/Output Summary (Last 24 hours) at 7/15/2021 1605  Last data filed at 7/15/2021 0835  Gross per 24 hour   Intake 2116.67 ml   Output 0 ml   Net 2116.67 ml     In: 360 [P.O.:360]  Out: 0    Wt Readings from Last 3 Encounters:   07/15/21 156 lb 8.4 oz (71 kg)   18 153 lb 3.5 oz (69.5 kg)   18 154 lb 5.2 oz (70 kg)     Temp  Av °F (36.7 °C)  Min: 97.6 °F (36.4 °C)  Max: 98.4 °F (36.9 °C)  Pulse  Av.6  Min: 46  Max: 72  BP  Min: 106/57  Max: 155/78  SpO2  Av.9 %  Min: 94 %  Max: 98 %    · Telemetry: Sinus rhythm with v-rates 37-55 bpm  · Constitutional: Alert. Oriented to person, place, and time. No distress. · Head: Normocephalic and atraumatic. · Mouth/Throat: Lips appear moist. Oropharynx is clear and moist.  · Eyes: Conjunctivae normal. EOM are normal.   · Neck: Neck supple. No lymphadenopathy. No rigidity. No JVD present. · Cardiovascular: Normal rate, regular rhythm. Normal S1&S2. Carotid pulse 2+ bilaterally. · Pulmonary/Chest: Bilateral respiratory sounds present. No respiratory accessory muscle use. No wheezes, No rhonchi. No rales. · Abdominal: Soft. Normal bowel sounds present. No distension, No tenderness. No splenomegaly. No hernia. · Musculoskeletal: No tenderness. Full range of motion in bilateral upper and lower extremities. No pitting BLE edema . · Lymphadenopathy: Has no cervical adenopathy. · Neurological: Alert and oriented. Cranial nerve II-XII grossly intact, No gross deficit to touch. · Skin: Skin is warm and dry. No rash, lesions, ulcerations noted. · Psychiatric: No anxiety nor agitation. Labs:  Reviewed. Recent Labs     07/13/21  1432 07/14/21  0732    140   K 3.2* 3.4*    109   CO2 21 23   PHOS 1.9*  --    BUN 13 7   CREATININE 0.6 0.7     Recent Labs     07/13/21  1432 07/14/21  0732   WBC 4.4 3.8*   HGB 14.0 12.1   HCT 40.8 34.7*   MCV 79.6* 80.9    123*     No results found for: CKTOTAL, CKMB, CKMBINDEX, TROPONINI  No results found for: BNP  No results found for: PROTIME, INR  No results found for: CHOL, HDL, TRIG    Diagnostic and imaging results reviewed. ECG: sinus bradycardia 49 bpm without ST-T wave changes. No QTc prolongation  Echo: (n/a)  Cath: (n/a)    I independently reviewed the ECG, telemetry, serology, echocardiographic results.     Scheduled Meds:   polyethylene glycol  4,000 mL Oral Once    potassium chloride  40 mEq Oral BID    dicyclomine  20 mg Oral TID AC    metroNIDAZOLE  500 mg Intravenous Q8H    ciprofloxacin  400 mg Intravenous Q12H    baclofen  5 mcg Intrathecal Daily    escitalopram  10 mg Oral Daily    montelukast  10 mg Oral Nightly    [Held by provider] nadolol  40 mg Oral Daily    pantoprazole  40 mg Oral QAM AC    pravastatin  20 mg Oral Nightly    tiZANidine  4 mg Oral Daily    Vitamin D  2,000 Units Oral BID    sodium chloride flush  5-40 mL Intravenous 2 times per day    enoxaparin  40 mg Subcutaneous Daily     Continuous Infusions:   sodium chloride       PRN Meds:.simethicone, sodium chloride flush, sodium chloride, ondansetron **OR** ondansetron, polyethylene glycol, acetaminophen **OR** acetaminophen, potassium chloride     Assessment:   Patient Active Problem List    Diagnosis Date Noted    Colitis 07/13/2021      Active Hospital Problems    Diagnosis Date Noted    Colitis [K52.9] 07/13/2021         Recommendation(s):    Sinus bradycardia  -most likely etiology is the increase in dilaudid since last Thursday.  -even so, the patient is asymptomatic from the sinus bradycardia. There is no indication for PPM implantation at this time.  -will defer to Pain clinic to titrate medications in her intraabdominal pump to control pain while allowing for adequate HR. Of note, even with sinus bradycardia 37-40's bpm, the patient is asymptomatic. Will sign off. Thank you for allowing me to participate in the care of Sherry Winkler . If you have any questions/comments, please do not hesitate to contact us.       Indiana Zafar MD, MS, [facility]  Cardiac Electrophysiology  [address]  [address] [facility], [address] [place] [address]  [facility] [address]  [phone]

## 2021-07-16 ENCOUNTER — ANESTHESIA EVENT (OUTPATIENT)
Dept: ENDOSCOPY | Age: 61
DRG: 372 | End: 2021-07-16
Payer: MEDICARE

## 2021-07-16 ENCOUNTER — ANESTHESIA (OUTPATIENT)
Dept: ENDOSCOPY | Age: 61
DRG: 372 | End: 2021-07-16
Payer: MEDICARE

## 2021-07-16 VITALS
OXYGEN SATURATION: 90 % | SYSTOLIC BLOOD PRESSURE: 109 MMHG | RESPIRATION RATE: 1 BRPM | DIASTOLIC BLOOD PRESSURE: 55 MMHG | TEMPERATURE: 98.6 F

## 2021-07-16 LAB
ANION GAP SERPL CALCULATED.3IONS-SCNC: 10 MMOL/L (ref 3–16)
BASOPHILS ABSOLUTE: 0 K/UL (ref 0–0.2)
BASOPHILS RELATIVE PERCENT: 0.4 %
BUN BLDV-MCNC: 4 MG/DL (ref 7–20)
CALCIUM SERPL-MCNC: 9.1 MG/DL (ref 8.3–10.6)
CHLORIDE BLD-SCNC: 104 MMOL/L (ref 99–110)
CO2: 25 MMOL/L (ref 21–32)
CREAT SERPL-MCNC: 0.7 MG/DL (ref 0.6–1.2)
EOSINOPHILS ABSOLUTE: 0 K/UL (ref 0–0.6)
EOSINOPHILS RELATIVE PERCENT: 0.9 %
GFR AFRICAN AMERICAN: >60
GFR NON-AFRICAN AMERICAN: >60
GLUCOSE BLD-MCNC: 94 MG/DL (ref 70–99)
HCT VFR BLD CALC: 33.8 % (ref 36–48)
HEMOGLOBIN: 11.7 G/DL (ref 12–16)
LYMPHOCYTES ABSOLUTE: 1.7 K/UL (ref 1–5.1)
LYMPHOCYTES RELATIVE PERCENT: 50.3 %
MCH RBC QN AUTO: 27.8 PG (ref 26–34)
MCHC RBC AUTO-ENTMCNC: 34.7 G/DL (ref 31–36)
MCV RBC AUTO: 80.2 FL (ref 80–100)
MONOCYTES ABSOLUTE: 0.3 K/UL (ref 0–1.3)
MONOCYTES RELATIVE PERCENT: 9.6 %
NEUTROPHILS ABSOLUTE: 1.3 K/UL (ref 1.7–7.7)
NEUTROPHILS RELATIVE PERCENT: 38.8 %
PDW BLD-RTO: 13.9 % (ref 12.4–15.4)
PLATELET # BLD: 115 K/UL (ref 135–450)
PMV BLD AUTO: 7.5 FL (ref 5–10.5)
POTASSIUM SERPL-SCNC: 4.2 MMOL/L (ref 3.5–5.1)
RBC # BLD: 4.21 M/UL (ref 4–5.2)
SODIUM BLD-SCNC: 139 MMOL/L (ref 136–145)
WBC # BLD: 3.3 K/UL (ref 4–11)

## 2021-07-16 PROCEDURE — 0DBK8ZX EXCISION OF ASCENDING COLON, VIA NATURAL OR ARTIFICIAL OPENING ENDOSCOPIC, DIAGNOSTIC: ICD-10-PCS | Performed by: INTERNAL MEDICINE

## 2021-07-16 PROCEDURE — 6370000000 HC RX 637 (ALT 250 FOR IP): Performed by: INTERNAL MEDICINE

## 2021-07-16 PROCEDURE — 6370000000 HC RX 637 (ALT 250 FOR IP): Performed by: NURSE PRACTITIONER

## 2021-07-16 PROCEDURE — 2709999900 HC NON-CHARGEABLE SUPPLY: Performed by: INTERNAL MEDICINE

## 2021-07-16 PROCEDURE — 2580000003 HC RX 258: Performed by: INTERNAL MEDICINE

## 2021-07-16 PROCEDURE — 94760 N-INVAS EAR/PLS OXIMETRY 1: CPT

## 2021-07-16 PROCEDURE — 3700000000 HC ANESTHESIA ATTENDED CARE: Performed by: INTERNAL MEDICINE

## 2021-07-16 PROCEDURE — 3609010600 HC COLONOSCOPY POLYPECTOMY SNARE/COLD BIOPSY: Performed by: INTERNAL MEDICINE

## 2021-07-16 PROCEDURE — 85025 COMPLETE CBC W/AUTO DIFF WBC: CPT

## 2021-07-16 PROCEDURE — 7100000001 HC PACU RECOVERY - ADDTL 15 MIN: Performed by: INTERNAL MEDICINE

## 2021-07-16 PROCEDURE — 6360000002 HC RX W HCPCS: Performed by: HOSPITALIST

## 2021-07-16 PROCEDURE — 0DB98ZX EXCISION OF DUODENUM, VIA NATURAL OR ARTIFICIAL OPENING ENDOSCOPIC, DIAGNOSTIC: ICD-10-PCS | Performed by: INTERNAL MEDICINE

## 2021-07-16 PROCEDURE — 36415 COLL VENOUS BLD VENIPUNCTURE: CPT

## 2021-07-16 PROCEDURE — 3609010300 HC COLONOSCOPY W/BIOPSY SINGLE/MULTIPLE: Performed by: INTERNAL MEDICINE

## 2021-07-16 PROCEDURE — 2500000003 HC RX 250 WO HCPCS: Performed by: HOSPITALIST

## 2021-07-16 PROCEDURE — 6360000002 HC RX W HCPCS: Performed by: INTERNAL MEDICINE

## 2021-07-16 PROCEDURE — 6370000000 HC RX 637 (ALT 250 FOR IP): Performed by: HOSPITALIST

## 2021-07-16 PROCEDURE — 0DBM8ZX EXCISION OF DESCENDING COLON, VIA NATURAL OR ARTIFICIAL OPENING ENDOSCOPIC, DIAGNOSTIC: ICD-10-PCS | Performed by: INTERNAL MEDICINE

## 2021-07-16 PROCEDURE — 2580000003 HC RX 258: Performed by: HOSPITALIST

## 2021-07-16 PROCEDURE — 2580000003 HC RX 258: Performed by: NURSE ANESTHETIST, CERTIFIED REGISTERED

## 2021-07-16 PROCEDURE — 80048 BASIC METABOLIC PNL TOTAL CA: CPT

## 2021-07-16 PROCEDURE — 3609012400 HC EGD TRANSORAL BIOPSY SINGLE/MULTIPLE: Performed by: INTERNAL MEDICINE

## 2021-07-16 PROCEDURE — 2500000003 HC RX 250 WO HCPCS: Performed by: INTERNAL MEDICINE

## 2021-07-16 PROCEDURE — 1200000000 HC SEMI PRIVATE

## 2021-07-16 PROCEDURE — 2500000003 HC RX 250 WO HCPCS: Performed by: NURSE ANESTHETIST, CERTIFIED REGISTERED

## 2021-07-16 PROCEDURE — 88305 TISSUE EXAM BY PATHOLOGIST: CPT

## 2021-07-16 PROCEDURE — 3700000001 HC ADD 15 MINUTES (ANESTHESIA): Performed by: INTERNAL MEDICINE

## 2021-07-16 PROCEDURE — 6360000002 HC RX W HCPCS: Performed by: NURSE ANESTHETIST, CERTIFIED REGISTERED

## 2021-07-16 PROCEDURE — 7100000000 HC PACU RECOVERY - FIRST 15 MIN: Performed by: INTERNAL MEDICINE

## 2021-07-16 PROCEDURE — 0DB78ZX EXCISION OF STOMACH, PYLORUS, VIA NATURAL OR ARTIFICIAL OPENING ENDOSCOPIC, DIAGNOSTIC: ICD-10-PCS | Performed by: INTERNAL MEDICINE

## 2021-07-16 RX ORDER — SODIUM CHLORIDE 9 MG/ML
INJECTION, SOLUTION INTRAVENOUS CONTINUOUS PRN
Status: DISCONTINUED | OUTPATIENT
Start: 2021-07-16 | End: 2021-07-16 | Stop reason: SDUPTHER

## 2021-07-16 RX ORDER — PROPOFOL 10 MG/ML
INJECTION, EMULSION INTRAVENOUS CONTINUOUS PRN
Status: DISCONTINUED | OUTPATIENT
Start: 2021-07-16 | End: 2021-07-16 | Stop reason: SDUPTHER

## 2021-07-16 RX ORDER — LIDOCAINE HYDROCHLORIDE 20 MG/ML
INJECTION, SOLUTION INFILTRATION; PERINEURAL PRN
Status: DISCONTINUED | OUTPATIENT
Start: 2021-07-16 | End: 2021-07-16 | Stop reason: SDUPTHER

## 2021-07-16 RX ORDER — LABETALOL HYDROCHLORIDE 5 MG/ML
5 INJECTION, SOLUTION INTRAVENOUS EVERY 10 MIN PRN
Status: DISCONTINUED | OUTPATIENT
Start: 2021-07-16 | End: 2021-07-16

## 2021-07-16 RX ORDER — ONDANSETRON 2 MG/ML
4 INJECTION INTRAMUSCULAR; INTRAVENOUS
Status: DISCONTINUED | OUTPATIENT
Start: 2021-07-16 | End: 2021-07-16

## 2021-07-16 RX ORDER — TRIAMTERENE AND HYDROCHLOROTHIAZIDE 37.5; 25 MG/1; MG/1
1 TABLET ORAL DAILY
Status: DISCONTINUED | OUTPATIENT
Start: 2021-07-16 | End: 2021-07-17 | Stop reason: HOSPADM

## 2021-07-16 RX ORDER — PROMETHAZINE HYDROCHLORIDE 25 MG/ML
6.25 INJECTION, SOLUTION INTRAMUSCULAR; INTRAVENOUS
Status: DISCONTINUED | OUTPATIENT
Start: 2021-07-16 | End: 2021-07-16

## 2021-07-16 RX ADMIN — CIPROFLOXACIN 400 MG: 2 INJECTION, SOLUTION INTRAVENOUS at 06:59

## 2021-07-16 RX ADMIN — ONDANSETRON 4 MG: 2 INJECTION INTRAMUSCULAR; INTRAVENOUS at 17:14

## 2021-07-16 RX ADMIN — METRONIDAZOLE 500 MG: 500 INJECTION, SOLUTION INTRAVENOUS at 05:13

## 2021-07-16 RX ADMIN — TRIAMTERENE AND HYDROCHLOROTHIAZIDE 1 TABLET: 37.5; 25 TABLET ORAL at 18:34

## 2021-07-16 RX ADMIN — CIPROFLOXACIN 400 MG: 2 INJECTION, SOLUTION INTRAVENOUS at 17:14

## 2021-07-16 RX ADMIN — Medication 2000 UNITS: at 10:27

## 2021-07-16 RX ADMIN — Medication 2000 UNITS: at 20:48

## 2021-07-16 RX ADMIN — POTASSIUM CHLORIDE 40 MEQ: 750 TABLET, FILM COATED, EXTENDED RELEASE ORAL at 10:27

## 2021-07-16 RX ADMIN — ESCITALOPRAM OXALATE 10 MG: 10 TABLET ORAL at 10:27

## 2021-07-16 RX ADMIN — PROPOFOL 250 MCG/KG/MIN: 10 INJECTION, EMULSION INTRAVENOUS at 13:20

## 2021-07-16 RX ADMIN — SODIUM CHLORIDE: 9 INJECTION, SOLUTION INTRAVENOUS at 13:16

## 2021-07-16 RX ADMIN — PANTOPRAZOLE SODIUM 40 MG: 40 TABLET, DELAYED RELEASE ORAL at 07:01

## 2021-07-16 RX ADMIN — TIZANIDINE 4 MG: 4 TABLET ORAL at 10:27

## 2021-07-16 RX ADMIN — PRAVASTATIN SODIUM 20 MG: 10 TABLET ORAL at 20:48

## 2021-07-16 RX ADMIN — DICYCLOMINE HYDROCHLORIDE 20 MG: 10 CAPSULE ORAL at 07:01

## 2021-07-16 RX ADMIN — ENOXAPARIN SODIUM 40 MG: 40 INJECTION SUBCUTANEOUS at 10:27

## 2021-07-16 RX ADMIN — LIDOCAINE HYDROCHLORIDE 50 MG: 20 INJECTION, SOLUTION INFILTRATION; PERINEURAL at 13:19

## 2021-07-16 RX ADMIN — DICYCLOMINE HYDROCHLORIDE 20 MG: 10 CAPSULE ORAL at 17:14

## 2021-07-16 RX ADMIN — MONTELUKAST 10 MG: 10 TABLET, FILM COATED ORAL at 20:48

## 2021-07-16 RX ADMIN — SODIUM CHLORIDE, PRESERVATIVE FREE 10 ML: 5 INJECTION INTRAVENOUS at 10:56

## 2021-07-16 RX ADMIN — SODIUM CHLORIDE, PRESERVATIVE FREE 10 ML: 5 INJECTION INTRAVENOUS at 20:49

## 2021-07-16 RX ADMIN — METRONIDAZOLE 500 MG: 500 INJECTION, SOLUTION INTRAVENOUS at 20:49

## 2021-07-16 RX ADMIN — BACLOFEN 5 MCG: 500 INJECTION, SOLUTION INTRATHECAL at 10:55

## 2021-07-16 ASSESSMENT — PAIN - FUNCTIONAL ASSESSMENT
PAIN_FUNCTIONAL_ASSESSMENT: PREVENTS OR INTERFERES SOME ACTIVE ACTIVITIES AND ADLS
PAIN_FUNCTIONAL_ASSESSMENT: 0-10

## 2021-07-16 ASSESSMENT — PULMONARY FUNCTION TESTS
PIF_VALUE: 0
PIF_VALUE: 1
PIF_VALUE: 0
PIF_VALUE: 1
PIF_VALUE: 0

## 2021-07-16 ASSESSMENT — PAIN SCALES - GENERAL
PAINLEVEL_OUTOF10: 0
PAINLEVEL_OUTOF10: 0

## 2021-07-16 ASSESSMENT — PAIN DESCRIPTION - DESCRIPTORS: DESCRIPTORS: ACHING;DISCOMFORT

## 2021-07-16 NOTE — H&P
Patient seen and examined by me prior to the procedure. The patient is stable for sedation. There have been no significant changes since my initial consultation note. Please reference this note for full details    The patient was counseled at length about the risks of rohan Covid-19 during their perioperative period and any recovery window from their procedure. The patient was made aware that rohan Covid-19  may worsen their prognosis for recovering from their procedure  and lend to a higher morbidity and/or mortality risk. All material risks, benefits, and reasonable alternatives including postponing the procedure were discussed. The patient does wish to proceed with the procedure at this time.     ASA class-3  Malampati- 2    Alexander Maricao Sheets, DO

## 2021-07-16 NOTE — PLAN OF CARE
Problem: Falls - Risk of:  Goal: Will remain free from falls  Description: Will remain free from falls  Outcome: Ongoing  Goal: Absence of physical injury  Description: Absence of physical injury  Outcome: Ongoing     Problem: Infection:  Goal: Will remain free from infection  Description: Will remain free from infection  Outcome: Ongoing     Problem: Safety:  Goal: Free from accidental physical injury  Description: Free from accidental physical injury  Outcome: Ongoing  Goal: Free from intentional harm  Description: Free from intentional harm  Outcome: Ongoing     Problem: Daily Care:  Goal: Daily care needs are met  Description: Daily care needs are met  Outcome: Ongoing     Problem: Discharge Planning:  Goal: Patients continuum of care needs are met  Description: Patients continuum of care needs are met  Outcome: Ongoing     Problem: Skin Integrity:  Goal: Will show no infection signs and symptoms  Description: Will show no infection signs and symptoms  Outcome: Ongoing  Goal: Absence of new skin breakdown  Description: Absence of new skin breakdown  Outcome: Ongoing

## 2021-07-16 NOTE — ANESTHESIA PRE PROCEDURE
[facility] Department of Anesthesiology  Pre-Anesthesia Evaluation/Consultation       Name:  Yusuf Perea  : 1960  Age:  59 y.o. MRN:  [id number]  Date: 2021           Surgeon: Surgeon(s):  Scott Garza DO    Procedure: Procedure(s):  ESOPHAGOGASTRODUODENOSCOPY  COLONOSCOPY     Allergies   Allergen Reactions    Latex Rash    Pcn [Penicillins]      Patient Active Problem List   Diagnosis    Colitis     Past Medical History:   Diagnosis Date    Depression     Hypertension     IBS (irritable bowel syndrome)     Migraines     RA (rheumatoid arthritis) (HCC)     RSD (reflex sympathetic dystrophy)      Past Surgical History:   Procedure Laterality Date    BACLOFEN PUMP IMPLANTATION Left     Pain pump    KNEE SURGERY Right     Arthroscopy / severed a nerve    NERVE SURGERY Right     Nerve clipping Rt leg above knee    SYMPATHECTOMY      TONSILLECTOMY      UPPER GASTROINTESTINAL ENDOSCOPY  10/03/2016     Social History     Tobacco Use    Smoking status: Never Smoker    Smokeless tobacco: Never Used   Substance Use Topics    Alcohol use: No    Drug use: No     Medications  No current facility-administered medications on file prior to encounter.      Current Outpatient Medications on File Prior to Encounter   Medication Sig Dispense Refill    abatacept (ORENCIA) 250 MG injection Infuse 125 mg intravenously once a week      vitamin D (CHOLECALCIFEROL) 25 MCG (1000 UT) TABS tablet Take 2,000 Units by mouth 2 times daily      lisinopril (PRINIVIL;ZESTRIL) 5 MG tablet Take 2.5 mg by mouth daily If SBP > 130      omeprazole (PRILOSEC) 40 MG delayed release capsule Take 40 mg by mouth daily      tiZANidine (ZANAFLEX) 4 MG tablet Take 4 mg by mouth daily      pravastatin (PRAVACHOL) 20 MG tablet Take 20 mg by mouth daily      CLONIDINE HCL, ANALGESIA, EP 49.95 mcg by Intrathecal route daily Pain pump syringe 200.0 mcg / ml      lubiprostone (AMITIZA) 8 MCG CAPS capsule Take 8 mcg by mouth 2 times daily (with meals)      nadolol (CORGARD) 40 MG tablet Take 40 mg by mouth daily.  triamterene-hydrochlorothiazide (DYAZIDE) 37.5-25 MG per capsule Take 1 capsule by mouth every morning.  montelukast (SINGULAIR) 10 MG tablet Take 10 mg by mouth nightly.  SUMAtriptan (IMITREX) 100 MG tablet Take 100 mg by mouth once as needed for Migraine.  HYDROmorphone (DILAUDID) 10 MG/ML injection 4.995 mg by Intrathecal route daily Pain pump Syringe 20.0mg/ ml      baclofen (LIORESAL) 10 MG/20ML SOLN 149.85 mcg by Intrathecal route daily Pain pump syringe 600.0 mcg/ ml      escitalopram (LEXAPRO) 10 MG tablet Take 10 mg by mouth daily.        Current Facility-Administered Medications   Medication Dose Route Frequency Provider Last Rate Last Admin    simethicone (MYLICON) chewable tablet 80 mg  80 mg Oral Q6H PRN Mary Screen, APRN - CNP   80 mg at 07/14/21 2320    dicyclomine (BENTYL) capsule 20 mg  20 mg Oral TID AC Beba Hyltonff, APRN - CNP   20 mg at 07/16/21 0701    metronidazole (FLAGYL) 500 mg in NaCl 100 mL IVPB premix  500 mg Intravenous Sunitha Luo MD   Stopped at 07/16/21 4682    ciprofloxacin (CIPRO) IVPB 400 mg  400 mg Intravenous Q12H Tc Hernandez MD   Stopped at 07/16/21 0802    Baclofen / Hydromorphone / Clonidine Intrathecal Pump  5 mcg Intrathecal Daily Tc Hernandez MD   5 mcg at 07/16/21 1055    escitalopram (LEXAPRO) tablet 10 mg  10 mg Oral Daily Tc Hernandez MD   10 mg at 07/16/21 1027    montelukast (SINGULAIR) tablet 10 mg  10 mg Oral Nightly Tc Hernandez MD   10 mg at 07/15/21 2043    [Held by provider] nadolol (CORGARD) tablet 40 mg  40 mg Oral Daily Tc Hernandez MD   40 mg at 07/14/21 0842    pantoprazole (PROTONIX) tablet 40 mg  40 mg Oral QAM AC Tc Hernandez MD   40 mg at 07/16/21 0701    pravastatin (PRAVACHOL) tablet 20 mg  20 mg Oral Nightly Tc Hernandez MD   20 mg at 07/15/21 2036    tiZANidine (ZANAFLEX) tablet 4 mg  4 mg Oral Daily Maira Foster, MD   4 mg at 21 102    vitamin D (CHOLECALCIFEROL) tablet 2,000 Units  2,000 Units Oral BID Maira Foster, MD   2,000 Units at 21 102    sodium chloride flush 0.9 % injection 5-40 mL  5-40 mL Intravenous 2 times per day Maira Foster, MD   10 mL at 21 1056    sodium chloride flush 0.9 % injection 5-40 mL  5-40 mL Intravenous PRN Jaydonse Fearing, MD        0.9 % sodium chloride infusion  25 mL Intravenous PRN Jaydonse Fearing, MD        enoxaparin (LOVENOX) injection 40 mg  40 mg Subcutaneous Daily Maira Foster, MD   40 mg at 21 102    ondansetron (ZOFRAN-ODT) disintegrating tablet 4 mg  4 mg Oral Q8H PRN Jaydonse Carling, MD        Or    ondansetron [facility]) injection 4 mg  4 mg Intravenous Q6H PRN Jaydonse Fearing, MD   4 mg at 07/15/21 2035    polyethylene glycol (GLYCOLAX) packet 17 g  17 g Oral Daily PRN Jaydonse Fearing, MD        acetaminophen (TYLENOL) tablet 650 mg  650 mg Oral Q6H PRN Jaydonse Fearing, MD   650 mg at 07/15/21 0502    Or    acetaminophen (TYLENOL) suppository 650 mg  650 mg Rectal Q6H PRN Jaydonse Fearing, MD        potassium chloride 10 mEq/100 mL IVPB (Peripheral Line)  10 mEq Intravenous PRN Jaydonse Fearing,  mL/hr at 21 1553 10 mEq at 21 1553     Vital Signs (Current)   Vitals:    21 1216   BP: (!) 159/68   Pulse: 58   Resp:    Temp: 98.7 °F (37.1 °C)   SpO2: 90%     Vital Signs Statistics (for past 48 hrs)     Temp  Av.2 °F (36.8 °C)  Min: 97.6 °F (36.4 °C)   Min taken time: 07/15/21 1431  Max: 98.7 °F (37.1 °C)   Max taken time: 21 1216  Pulse  Av.5  Min: 55   Min taken time: 07/15/21 0930  Max: 67   Max taken time: 07/15/21 1431  Resp  Av.7  Min: 12   Min taken time: 21 0801  Max: 18   Max taken time: 21 1019  BP  Min: 106/57   Min taken time: 07/15/21 1431  Max: 187/78   Max taken time: 07/15/21 2330  MAP (mmHg)  Av.4  Min: 68   Min taken time: 07/15/21 1431  Max: 115   Max taken time: 07/15/21 2330  SpO2  Av.9 %  Min: 90 %   Min taken time: 21 1216  Max: 100 %   Max taken time: 07/15/21 2330    BP Readings from Last 3 Encounters:   21 (!) 159/68   18 137/72   18 (!) 168/87     BMI  Body mass index is 29.84 kg/m². Estimated body mass index is 29.84 kg/m² as calculated from the following:    Height as of this encounter: 5' 3\" (1.6 m). Weight as of this encounter: 168 lb 6.9 oz (76.4 kg).     CBC   Lab Results   Component Value Date    WBC 3.3 2021    RBC 4.21 2021    HGB 11.7 2021    HCT 33.8 2021    MCV 80.2 2021    RDW 13.9 2021     2021     CMP    Lab Results   Component Value Date     2021    K 3.4 2021     2021    CO2 23 2021    BUN 7 2021    CREATININE 0.7 2021    GFRAA >60 2021    AGRATIO 1.5 2021    LABGLOM >60 2021    GLUCOSE 114 2021    PROT 5.6 2021    CALCIUM 8.4 2021    BILITOT 0.5 2021    ALKPHOS 58 2021    AST 14 2021    ALT 7 2021     BMP    Lab Results   Component Value Date     2021    K 3.4 2021     2021    CO2 23 2021    BUN 7 2021    CREATININE 0.7 2021    CALCIUM 8.4 2021    GFRAA >60 2021    LABGLOM >60 2021    GLUCOSE 114 2021     POCGlucose  Recent Labs     21  1432 21  0732   GLUCOSE 102* 114*      Coags  No results found for: PROTIME, INR, APTT  HCG (If Applicable) No results found for: PREGTESTUR, PREGSERUM, HCG, HCGQUANT   ABGs No results found for: PHART, PO2ART, URG0ODH, GSH3JDN, BEART, S1IGTYSQ   Type & Screen (If Applicable)  No results found for: LABABO, LABRH                         BMI: Wt Readings from Last 3 Encounters:       NPO Status: 8 hours                          Anesthesia Evaluation  Patient summary reviewed no history of anesthetic complications:   Airway: Mallampati: III  TM distance: >3 FB   Neck ROM: full   Dental:          Pulmonary:Negative Pulmonary ROS and normal exam                               Cardiovascular:  Exercise tolerance: good (>4 METS),   (+) hypertension:,       ECG reviewed  Rhythm: regular  Rate: normal                    Neuro/Psych:   (+) neuromuscular disease (RSD):, headaches:, psychiatric history:depression/anxiety             GI/Hepatic/Renal:   (+) bowel prep,          ROS comment: IBS. Endo/Other:    (+) : arthritis: rheumatoid. , .                 Abdominal:             Vascular: negative vascular ROS. Other Findings: Poor dentition throughout              Anesthesia Plan      MAC     ASA 3       Induction: intravenous. Anesthetic plan and risks discussed with patient. Plan discussed with CRNA. This pre-anesthesia assessment may be used as a history and physical.    DOS STAFF ADDENDUM:    Pt seen and examined, chart reviewed (including anesthesia, drug and allergy history). No interval changes to history and physical examination. Anesthetic plan, risks, benefits, alternatives, and personnel involved discussed with patient. Questions and concerns addressed. Patient(family) verbalized an understanding and agrees to proceed.       Andrez Lorenzana MD  July 16, 2021  12:55 PM

## 2021-07-16 NOTE — OP NOTE
Endoscopy Note    Patient: Annetta Lagunas  : 1960  Acct#:     Procedure: Esophagogastroduodenoscopy with biopsy                       Colonoscopy with biopsy, polypectomy (cold snare)    Date:  2021     Endoscopist:   Funmi Gannon DO    Referring Physician:  Salty Pham MD    Indications: This is a 59y.o. year old female who presents today with diarrhea and colonic wall thickening on CT. Previous Colonoscopy: YES  Date: unknown  Greater than 3 years: YES    Postoperative Diagnosis:   1) The esophagus was normal without evidence of esophagitis, Arredondo's esophagus, strictures, rings, furrowing, masses, or nodules. The gastroesophageal junction was at 35 cm from the incisors. 2) There was a 2 cm sliding hiatal hernia noted. 3) There was some antral and pre-pyloric gastric erythema and gastritis noted. This was biopsied. 4) There was mild duodenitis in the duodenal bulb. 5) The ampulla was normal appearing. 6) The villous pattern appeared normal, but given symptoms, multiple small bowel biopsies were obtained to evaluate for celiac disease. 7) The terminal ileum was normal appearing. 8) The colonic mucosa appeared grossly normal. In light of the patient's symptoms of chronic diarrhea, random colonic biopsies were performed from the right and left colon to evaluate for microscopic colitis. 9) A 4 mm polyp in the descending colon was removed completely with cold snare polypectomy. 10) Moderate internal hemorrhoids were noted. Anesthesia:  The patient was administered TIVA per anesthesiology team.  Please see their operative records for full details of medications administered. Consent:  The patient or their legal guardian has signed a consent, and is aware of the potential risks, benefits, alternatives, and potential complications of this procedure.   These include, but are not limited to hemorrhage, bleeding, post procedural pain, perforation, phlebitis, aspiration, hypotension, hypoxia, cardiovascular events such as arryhthmia, and possibly death. Additionally, the possibility of missed colonic polyps and interval colon cancer was discussed in the consent. Description of Procedure: The patient was then taken to the endoscopy suite, placed in the left lateral decubitus position and the above IV sedation was administrered. The Olympus video endoscope was placed through the patient's oropharynx without difficulty to the extent of the 2nd portion of the duodenum. Both forward and retroflexed views of the stomach were obtained. Findings:    1) The esophagus was normal without evidence of esophagitis, Arredondo's esophagus, strictures, rings, furrowing, masses, or nodules. The gastroesophageal junction was at 35 cm from the incisors. 2) There was a 2 cm sliding hiatal hernia noted. 3) There was some antral and pre-pyloric gastric erythema and gastritis noted. This was biopsied. 4) There was mild duodenitis in the duodenal bulb. 5) The ampulla was normal appearing. 6) The villous pattern appeared normal, but given symptoms, multiple small bowel biopsies were obtained to evaluate for celiac disease. The scope was then withdrawn back into the stomach, it was decompressed, and the scope was completely withdrawn. A digital rectal examination was performed and revealed negative without mass, lesions or tenderness, internal hemorrhoids noted. The Olympus video colonoscope was placed in the patient's rectum under digital direction and advanced to the cecum. The cecum was identified by characteristic anatomy and ballottment. The prep was good. The ileocecal valve was identified. The terminal ileum was normal appearing. The scope was then withdrawn back through the cecum, ascending, transverse, descending, sigmoid colon, and rectum. Careful circumferential examination of the mucosa in these areas demonstrated:     The colonic mucosa appeared grossly normal. In light of the patient's symptoms of chronic diarrhea, random colonic biopsies were performed from the right and left colon to evaluate for microscopic colitis. A 4 mm polyp in the descending colon was removed completely with cold snare polypectomy. The scope was then withdrawn into the rectum and retroflexed. The retroflexed view of the anal verge and rectum demonstrates moderate internal hemorrhoids. The scope was straightened, the colon was decompressed and the scope was withdrawn from the patient. The patient tolerated the procedure well and was taken to the PACU in good condition. Estimated blood loss:  <5ml    ID Type Source Tests Collected by Time Destination   A : small bowel biopsy Tissue Duodenum SURGICAL PATHOLOGY Lurdes Mendoza., DO 7/16/2021 1326    B : biopsy antrum Gastric Gastric SURGICAL PATHOLOGY Lurdes Smith, DO 7/16/2021 1326        Impression:    1) See post procedure diagnoses    Recommendations:   1) Follow up on labs to evaluate patient's diarrhea  2) Follow up on biopsies  3) Advance patient's diet as tolerated  4) Continue supportive care  5) The patient had biopsies taken today. The patient should call for results in 7 days if they have not heard from our office. Our number is [phone]   7) The patient had colon polyp(s) successfully removed today. Geneva Newberry is a small risk for these sites to bleed for up to several weeks out from the procedure. The patient is instructed to call if there is any significant amounts of  rectal bleeding, abdominal pain, dizziness, or other complaints thought to be related to the procedure. 7) The patient is recommended to have a repeat colonoscopy in 5 or 10 years. This will be determined after the biopsies of the colon polyps are reviewed.       Lurdes Mendoza, DO  [address] and [address] [facility]

## 2021-07-16 NOTE — ANESTHESIA POSTPROCEDURE EVALUATION
[facility] Department of Anesthesiology  Post-Anesthesia Note       Name:  Maged Hubbard                                  Age:  59 y.o. MRN:  [id number]     Last Vitals & Oxygen Saturation: BP (!) 126/56   Pulse 59   Temp 97.6 °F (36.4 °C)   Resp 11   Ht 5' 3\" (1.6 m)   Wt 168 lb 6.9 oz (76.4 kg)   SpO2 92%   BMI 29.84 kg/m²   Patient Vitals for the past 4 hrs:   BP Temp Temp src Pulse Resp SpO2   07/16/21 1417 (!) 126/56 97.6 °F (36.4 °C)  59 11 92 %   07/16/21 1412 (!) 120/57   58 10 93 %   07/16/21 1407 117/62   58 11 97 %   07/16/21 1402 (!) 113/52   57 14 97 %   07/16/21 1357 (!) 107/49   60 12 97 %   07/16/21 1353 (!) 109/51 98.5 °F (36.9 °C)  61 12 97 %   07/16/21 1216 (!) 159/68 98.7 °F (37.1 °C) Temporal 58  90 %       Level of consciousness:  Awake, alert    Respiratory: Respirations easy, no distress. Stable. Cardiovascular: Hemodynamically stable. Hydration: Adequate. PONV: Adequately managed. Post-op pain: Adequately controlled. Post-op assessment: Tolerated anesthetic well without complication. Complications:  None.     Kaushal Sosa MD  July 16, 2021   2:59 PM

## 2021-07-16 NOTE — PROGRESS NOTES
Hospitalist Progress Note      PCP: Subha Verduzco    Date of Admission: 7/13/2021    Subjective: drank prep overnight, getting colon/EGD today    Medications:  Reviewed    Infusion Medications    sodium chloride       Scheduled Medications    triamterene-hydroCHLOROthiazide  1 tablet Oral Daily    dicyclomine  20 mg Oral TID AC    metroNIDAZOLE  500 mg Intravenous Q8H    ciprofloxacin  400 mg Intravenous Q12H    baclofen  5 mcg Intrathecal Daily    escitalopram  10 mg Oral Daily    montelukast  10 mg Oral Nightly    [Held by provider] nadolol  40 mg Oral Daily    pantoprazole  40 mg Oral QAM AC    pravastatin  20 mg Oral Nightly    tiZANidine  4 mg Oral Daily    Vitamin D  2,000 Units Oral BID    sodium chloride flush  5-40 mL Intravenous 2 times per day    enoxaparin  40 mg Subcutaneous Daily     PRN Meds: simethicone, sodium chloride flush, sodium chloride, ondansetron **OR** ondansetron, polyethylene glycol, acetaminophen **OR** acetaminophen, potassium chloride      Intake/Output Summary (Last 24 hours) at 7/16/2021 1752  Last data filed at 7/16/2021 1407  Gross per 24 hour   Intake 50 ml   Output --   Net 50 ml       Physical Exam Performed:    BP (!) 126/56   Pulse 59   Temp 97.6 °F (36.4 °C)   Resp 11   Ht 5' 3\" (1.6 m)   Wt 168 lb 6.9 oz (76.4 kg)   SpO2 92%   BMI 29.84 kg/m²        General appearance:  No apparent distress, appears stated age and cooperative. HEENT:  Normal cephalic, atraumatic without obvious deformity. Pupils equal, round, and Extra ocular muscles intact. Conjunctivae/corneas clear. Neck: Supple, with full range of motion. No jugular venous distention. Trachea midline. Respiratory:  Normal respiratory effort. Clear to auscultation, bilaterally without Rales/Wheezes/Rhonchi. Cardiovascular:  Regular rate and rhythm with normal S1/S2 without murmurs, rubs or gallops.   Abdomen: Soft, non-tender, non-distended with normal bowel sounds. Musculoskeletal:  No clubbing, cyanosis or edema bilaterally.     Skin: Skin color, texture, turgor normal.  No rashes or lesions. Neurologic:   grossly non-focal.  Psychiatric:  Alert and oriented, thought content appropriate, normal insight       Labs:   Recent Labs     07/14/21  0732 07/16/21  1152   WBC 3.8* 3.3*   HGB 12.1 11.7*   HCT 34.7* 33.8*   * 115*     Recent Labs     07/14/21  0732 07/16/21  1152    139   K 3.4* 4.2    104   CO2 23 25   BUN 7 4*   CREATININE 0.7 0.7   CALCIUM 8.4 9.1     Recent Labs     07/14/21  0732   AST 14*   ALT 7*   BILIDIR <0.2   BILITOT 0.5   ALKPHOS 58     No results for input(s): INR in the last 72 hours. No results for input(s): Gil Oiler in the last 72 hours. Urinalysis:      Lab Results   Component Value Date    NITRU Negative 07/13/2021    WBCUA 36 04/03/2018    BACTERIA 1+ 04/03/2018    RBCUA 9 04/03/2018    BLOODU Negative 07/13/2021    SPECGRAV >1.030 07/13/2021    GLUCOSEU Negative 07/13/2021       Radiology:  CT ABDOMEN PELVIS W IV CONTRAST Additional Contrast? None   Final Result   The wall of the rectum and sigmoid appears thickened. Although the findings   may be artifactual due to collapse, findings concerning for an infectious or   inflammatory etiology. No evidence of obstruction. US GALLBLADDER RUQ   Final Result   Unremarkable right upper quadrant ultrasound. Assessment/Plan:    Active Hospital Problems    Diagnosis     Colitis [K52.9]          1. Acute colitis  - suspect infectious - probably bacterial  - CT abd reviewed  - cont cipro/flagyl  - stool sent for work-up  - consulted GI  - plan EGD/colon today     2. RA/RSD  - continue patient's medications per infusion pump  - denies worsening of symptoms     3. Hypophosphatemia / hypokalemia  - replete     4. Bradycardia - hold nadolol, monitor on tele.  Pt is asymptomatic, Cardio consulted - appr recs        DVT Prophylaxis: lovenox  Diet: ADULT DIET;  Regular  Code Status: Full Code    PT/OT Eval Status: ordered    Dispo - poss dc in am when diarrhea improved    Maite Steven MD

## 2021-07-16 NOTE — PLAN OF CARE
Problem: Falls - Risk of:  Goal: Will remain free from falls  Description: Will remain free from falls  7/16/2021 0008 by Dar Land RN  Outcome: Ongoing  7/15/2021 1030 by Ilana Blankenship RN  Outcome: Ongoing  Goal: Absence of physical injury  Description: Absence of physical injury  7/16/2021 0008 by Dar Land RN  Outcome: Ongoing  7/15/2021 1030 by Ilana Blankenship RN  Outcome: Ongoing     Problem: Infection:  Goal: Will remain free from infection  Description: Will remain free from infection  7/16/2021 0008 by Dar Land RN  Outcome: Ongoing  7/15/2021 1030 by Ilana Blankenship RN  Outcome: Ongoing     Problem: Safety:  Goal: Free from accidental physical injury  Description: Free from accidental physical injury  7/16/2021 0008 by Dar Land RN  Outcome: Ongoing  7/15/2021 1030 by Ilana Blankenship RN  Outcome: Ongoing  Goal: Free from intentional harm  Description: Free from intentional harm  7/16/2021 0008 by Dar Land RN  Outcome: Ongoing  7/15/2021 1030 by Ilana Blankenship RN  Outcome: Ongoing     Problem: Daily Care:  Goal: Daily care needs are met  Description: Daily care needs are met  7/16/2021 0008 by Dar Land RN  Outcome: Ongoing  7/15/2021 1030 by Ilana Blankenship RN  Outcome: Ongoing     Problem: Pain:  Goal: Patient's pain/discomfort is manageable  Description: Patient's pain/discomfort is manageable  7/16/2021 0008 by Dar Land RN  Outcome: Ongoing  7/15/2021 1030 by Ilana Blankenship RN  Outcome: Ongoing  Goal: Pain level will decrease  Description: Pain level will decrease  7/16/2021 0008 by Dar Land RN  Outcome: Ongoing  7/15/2021 1030 by Ilana Blankenship RN  Outcome: Ongoing  Goal: Control of acute pain  Description: Control of acute pain  7/16/2021 0008 by Dar Land RN  Outcome: Ongoing  7/15/2021 1030 by Ilana Blankenship RN  Outcome: Ongoing  Goal: Control of chronic pain  Description: Control of chronic pain  7/16/2021 0008 by Briana Cosme RN  Outcome: Ongoing  7/15/2021 1030 by Massiel Carr RN  Outcome: Ongoing     Problem: Discharge Planning:  Goal: Patients continuum of care needs are met  Description: Patients continuum of care needs are met  7/16/2021 0008 by Briana Cosme RN  Outcome: Ongoing  7/15/2021 1030 by Massiel Carr RN  Outcome: Ongoing     Problem: Skin Integrity:  Goal: Will show no infection signs and symptoms  Description: Will show no infection signs and symptoms  7/16/2021 0008 by Briana Cosme RN  Outcome: Ongoing  7/15/2021 1030 by Msasiel Carr RN  Outcome: Ongoing  Goal: Absence of new skin breakdown  Description: Absence of new skin breakdown  7/16/2021 0008 by Briana Cosme RN  Outcome: Ongoing  7/15/2021 1030 by Massiel Carr RN  Outcome: Ongoing

## 2021-07-17 VITALS
TEMPERATURE: 98.6 F | RESPIRATION RATE: 18 BRPM | WEIGHT: 166.01 LBS | BODY MASS INDEX: 29.41 KG/M2 | HEART RATE: 57 BPM | OXYGEN SATURATION: 99 % | DIASTOLIC BLOOD PRESSURE: 74 MMHG | HEIGHT: 63 IN | SYSTOLIC BLOOD PRESSURE: 160 MMHG

## 2021-07-17 LAB
5 HIAA URINE (PER GM CREAT): 3 MG/GCR (ref 0–14)
5-HIAA 24 HOUR URINE: NORMAL MG/D (ref 0–15)
5-HIAA INTERPRETATION: NORMAL
5-HIAA URINE: 0.6 MG/L
BLOOD CULTURE, ROUTINE: NORMAL
CHROMOGRANIN A: 47 NG/ML (ref 0–103)
CREATININE 24 HOUR URINE: NORMAL MG/D (ref 500–1400)
CREATININE URINE: 19 MG/DL
CULTURE, BLOOD 2: NORMAL
HISTAMINE, WHOLE BLOOD: 406 NMOL/L (ref 180–1800)
HOURS COLLECTED: NORMAL
URINE TOTAL VOLUME: NORMAL

## 2021-07-17 PROCEDURE — 6370000000 HC RX 637 (ALT 250 FOR IP): Performed by: INTERNAL MEDICINE

## 2021-07-17 PROCEDURE — 2500000003 HC RX 250 WO HCPCS: Performed by: INTERNAL MEDICINE

## 2021-07-17 PROCEDURE — 94760 N-INVAS EAR/PLS OXIMETRY 1: CPT

## 2021-07-17 PROCEDURE — 6360000002 HC RX W HCPCS: Performed by: INTERNAL MEDICINE

## 2021-07-17 PROCEDURE — 2580000003 HC RX 258: Performed by: INTERNAL MEDICINE

## 2021-07-17 RX ORDER — ONDANSETRON 4 MG/1
4 TABLET, ORALLY DISINTEGRATING ORAL EVERY 8 HOURS PRN
Qty: 60 TABLET | Refills: 1 | Status: SHIPPED | OUTPATIENT
Start: 2021-07-17

## 2021-07-17 RX ADMIN — ESCITALOPRAM OXALATE 10 MG: 10 TABLET ORAL at 09:30

## 2021-07-17 RX ADMIN — TRIAMTERENE AND HYDROCHLOROTHIAZIDE 1 TABLET: 37.5; 25 TABLET ORAL at 09:30

## 2021-07-17 RX ADMIN — ONDANSETRON 4 MG: 4 TABLET, ORALLY DISINTEGRATING ORAL at 20:57

## 2021-07-17 RX ADMIN — PANTOPRAZOLE SODIUM 40 MG: 40 TABLET, DELAYED RELEASE ORAL at 06:01

## 2021-07-17 RX ADMIN — ACETAMINOPHEN 650 MG: 325 TABLET ORAL at 04:40

## 2021-07-17 RX ADMIN — SODIUM CHLORIDE, PRESERVATIVE FREE 10 ML: 5 INJECTION INTRAVENOUS at 09:33

## 2021-07-17 RX ADMIN — METRONIDAZOLE 500 MG: 500 INJECTION, SOLUTION INTRAVENOUS at 04:32

## 2021-07-17 RX ADMIN — TIZANIDINE 4 MG: 4 TABLET ORAL at 09:30

## 2021-07-17 RX ADMIN — Medication 2000 UNITS: at 20:54

## 2021-07-17 RX ADMIN — DICYCLOMINE HYDROCHLORIDE 20 MG: 10 CAPSULE ORAL at 11:33

## 2021-07-17 RX ADMIN — Medication 2000 UNITS: at 09:30

## 2021-07-17 RX ADMIN — PRAVASTATIN SODIUM 20 MG: 10 TABLET ORAL at 20:54

## 2021-07-17 RX ADMIN — CIPROFLOXACIN 400 MG: 2 INJECTION, SOLUTION INTRAVENOUS at 05:34

## 2021-07-17 RX ADMIN — DICYCLOMINE HYDROCHLORIDE 20 MG: 10 CAPSULE ORAL at 16:08

## 2021-07-17 RX ADMIN — ENOXAPARIN SODIUM 40 MG: 40 INJECTION SUBCUTANEOUS at 09:30

## 2021-07-17 RX ADMIN — DICYCLOMINE HYDROCHLORIDE 20 MG: 10 CAPSULE ORAL at 06:01

## 2021-07-17 RX ADMIN — MONTELUKAST 10 MG: 10 TABLET, FILM COATED ORAL at 20:54

## 2021-07-17 RX ADMIN — ONDANSETRON 4 MG: 2 INJECTION INTRAMUSCULAR; INTRAVENOUS at 05:29

## 2021-07-17 ASSESSMENT — PAIN SCALES - GENERAL
PAINLEVEL_OUTOF10: 0
PAINLEVEL_OUTOF10: 7
PAINLEVEL_OUTOF10: 0
PAINLEVEL_OUTOF10: 6

## 2021-07-17 ASSESSMENT — PAIN DESCRIPTION - PAIN TYPE: TYPE: ACUTE PAIN

## 2021-07-17 ASSESSMENT — PAIN DESCRIPTION - LOCATION: LOCATION: ABDOMEN

## 2021-07-17 NOTE — DISCHARGE SUMMARY
Hospital Medicine Discharge Summary    Patient ID: Amparo Moss      Patient's PCP: Lito Ochoa Date: 7/13/2021     Discharge Date: 7/17/21    Admitting Physician: Soren Campbell MD     Discharge Physician: Diana Olguin MD     Discharge Diagnoses: Active Hospital Problems    Diagnosis     Colitis [K52.9]        The patient was seen and examined on day of discharge and this discharge summary is in conjunction with any daily progress note from day of discharge. Hospital Course:   59 y.o. female presents with a 3 week history of intermittent diarrhea. She states the episodes have been becoming more frequent. She denies fever, chills, recent antibiotic use, new medication, similar sick contacts. Stools have been watery, non-mucoid, denies black/bloody stools with loose stools becoming bilious lately along with development of RLQ abdominal cramping.        1. Acute colitis vs secretory diarrhea  - suspect infectious - probably bacterial  - CT abd reviewed  - treated with cipro/flagyl-->stop on dc  - stool sent for work-up  - consulted GI  - s/p EGD/colon with biopsies sent. - ok to dc home and have outpt f/u with GI     2. RA/RSD  - continue patient's medications per infusion pump  - denies worsening of symptoms     3. Hypophosphatemia / hypokalemia  - replete     4. Bradycardia - held nadolol, monitor on tele. Pt is asymptomatic, Cardio consulted - appr recs. outpt f/u with her pain physician       Physical Exam Performed:     BP (!) 133/54   Pulse 66   Temp 98.6 °F (37 °C) (Oral)   Resp 18   Ht 5' 3\" (1.6 m)   Wt 166 lb 0.1 oz (75.3 kg)   SpO2 95%   BMI 29.41 kg/m²        General appearance:  No apparent distress, appears stated age and cooperative. HEENT:  Normal cephalic, atraumatic without obvious deformity. Pupils equal, round, and Extra ocular muscles intact. Conjunctivae/corneas clear. Neck: Supple, with full range of motion.  No jugular venous distention. Trachea midline. Respiratory:  Normal respiratory effort. Clear to auscultation, bilaterally without Rales/Wheezes/Rhonchi. Cardiovascular:  Regular rate and rhythm with normal S1/S2 without murmurs, rubs or gallops. Abdomen: Soft, non-tender, non-distended with normal bowel sounds. Musculoskeletal:  No clubbing, cyanosis or edema bilaterally.     Skin: Skin color, texture, turgor normal.  No rashes or lesions. Neurologic:   grossly non-focal.  Psychiatric:  Alert and oriented, thought content appropriate, normal insight       Labs: For convenience and continuity at follow-up the following most recent labs are provided:      CBC:    Lab Results   Component Value Date    WBC 3.3 07/16/2021    HGB 11.7 07/16/2021    HCT 33.8 07/16/2021     07/16/2021       Renal:    Lab Results   Component Value Date     07/16/2021    K 4.2 07/16/2021    K 3.4 07/14/2021     07/16/2021    CO2 25 07/16/2021    BUN 4 07/16/2021    CREATININE 0.7 07/16/2021    CALCIUM 9.1 07/16/2021    PHOS 1.9 07/13/2021         Significant Diagnostic Studies    Radiology:   CT ABDOMEN PELVIS W IV CONTRAST Additional Contrast? None   Final Result   The wall of the rectum and sigmoid appears thickened. Although the findings   may be artifactual due to collapse, findings concerning for an infectious or   inflammatory etiology. No evidence of obstruction. US GALLBLADDER RUQ   Final Result   Unremarkable right upper quadrant ultrasound.                 Consults:     IP CONSULT TO SPIRITUAL SERVICES  IP CONSULT TO GI  IP CONSULT TO CARDIOLOGY    Disposition:  home     Condition at Discharge: Stable    Discharge Instructions/Follow-up:  PCP in 1 week    Code Status:  Full Code     Activity: activity as tolerated    Diet: regular diet      Discharge Medications:     Current Discharge Medication List           Details   ondansetron (ZOFRAN-ODT) 4 MG disintegrating tablet Take 1 tablet by mouth every 8 hours as needed for Nausea or Vomiting  Qty: 60 tablet, Refills: 1              Details   abatacept (ORENCIA) 250 MG injection Infuse 125 mg intravenously once a week      vitamin D (CHOLECALCIFEROL) 25 MCG (1000 UT) TABS tablet Take 2,000 Units by mouth 2 times daily      lisinopril (PRINIVIL;ZESTRIL) 5 MG tablet Take 2.5 mg by mouth daily If SBP > 130      omeprazole (PRILOSEC) 40 MG delayed release capsule Take 40 mg by mouth daily      tiZANidine (ZANAFLEX) 4 MG tablet Take 4 mg by mouth daily      pravastatin (PRAVACHOL) 20 MG tablet Take 20 mg by mouth daily      CLONIDINE HCL, ANALGESIA, EP 49.95 mcg by Intrathecal route daily Pain pump syringe 200.0 mcg / ml      lubiprostone (AMITIZA) 8 MCG CAPS capsule Take 8 mcg by mouth 2 times daily (with meals)      nadolol (CORGARD) 40 MG tablet Take 40 mg by mouth daily. triamterene-hydrochlorothiazide (DYAZIDE) 37.5-25 MG per capsule Take 1 capsule by mouth every morning.      montelukast (SINGULAIR) 10 MG tablet Take 10 mg by mouth nightly. SUMAtriptan (IMITREX) 100 MG tablet Take 100 mg by mouth once as needed for Migraine. HYDROmorphone (DILAUDID) 10 MG/ML injection 4.995 mg by Intrathecal route daily Pain pump Syringe 20.0mg/ ml      baclofen (LIORESAL) 10 MG/20ML SOLN 149.85 mcg by Intrathecal route daily Pain pump syringe 600.0 mcg/ ml      escitalopram (LEXAPRO) 10 MG tablet Take 10 mg by mouth daily. Time Spent on discharge is more than 30 minutes in the examination, evaluation, counseling and review of medications and discharge plan. Signed:    Vivian Coelho MD   7/17/2021      Thank you Ellie Castillo for the opportunity to be involved in this patient's care. If you have any questions or concerns please feel free to contact me at [phone].

## 2021-07-17 NOTE — PROGRESS NOTES
Patient sitting in chair, c/o abdominal cramps and states she is urinating on herself and doesn't think she should go home, states she was very sick and lives alone - she had sweats and diarrhea at night. Patient does not wish for this RN to send a note to the physician and states she is ready to go home and is calling her ride now. 2055 -  There has been some confusion in picking the patient up - patient is attempting to find a ride. patient requested Zofran for nausea and is also going to give herself a bolus from her pain pump. 2124 Removed heart monitor and called [facility]. Removed IV. Patient tolerated well. Patient transported to [facility] by transportation in wheel chair.

## 2021-07-17 NOTE — PROGRESS NOTES
Gastroenterology Progress Note    Joseph Bell is a 59 y.o. female patient. Hospitalization Day:4    SUBJECTIVE:  No diarrhea. Tolerating diet. Still with gas and abdominal gurgling noted. Some nausea reported. ROS:  Gastrointestinal ROS: positive for - abdominal pain and nausea/vomiting    Physical    VITALS:  BP (!) 132/58   Pulse 59   Temp 98.7 °F (37.1 °C) (Oral)   Resp 18   Ht 5' 3\" (1.6 m)   Wt 166 lb 0.1 oz (75.3 kg)   SpO2 94%   BMI 29.41 kg/m²   TEMPERATURE:  Current - Temp: 98.7 °F (37.1 °C); Max - Temp  Av.3 °F (36.8 °C)  Min: 97.6 °F (36.4 °C)  Max: 98.7 °F (37.1 °C)    General:  Alert and oriented,  No apparent distress  Skin- without jaundice  Eyes: anicteric sclera  Cardiac: RRR, Nl s1s2, without murmurs  Lungs CTA Bilaterally, normal effort  Abdomen soft, ND, NT, no HSM, Bowel sounds normal, Baclofen pump noted in LUQ  Ext: without edema  Neuro: no asterixis     Data    Data Review:    Recent Labs     21  1152   WBC 3.3*   HGB 11.7*   HCT 33.8*   MCV 80.2   *     Recent Labs     21  1152      K 4.2      CO2 25   BUN 4*   CREATININE 0.7     No results for input(s): AST, ALT, ALB, BILIDIR, BILITOT, ALKPHOS in the last 72 hours. No results for input(s): LIPASE, AMYLASE in the last 72 hours. No results for input(s): PROTIME, INR in the last 72 hours. Radiology Review:    CT ABDOMEN PELVIS W IV CONTRAST Additional Contrast? None   Final Result   The wall of the rectum and sigmoid appears thickened. Although the findings   may be artifactual due to collapse, findings concerning for an infectious or   inflammatory etiology. No evidence of obstruction. US GALLBLADDER RUQ   Final Result   Unremarkable right upper quadrant ultrasound. ASSESSMENT: 59 y.o. female with PMH of RA, IBS, HTN, Migraines, Complex Regional Pain Syndrome, pain pump implant who presents with:     1. Acute on Chronic secretory diarrhea- improved.  ? Superimposed viral gastroenteritis. She does have some intermittent diarrhea with flushing. Workup has been sent off. So far stool studies (O and P, culture, Cdiff negative, fecal leukocytes pending). ESR and CRP normal.  TTG normal.  Immunoglobulins normal.  Histamine normal.  5HIAA, gastrin, VIP, calcitonin, and other secretory labs pending  Colonic wall thickening of the rectum and sigmoid colon suspect due to under-distention- Colonoscopy negative for overt colitis. Small bowel and colon biopsies pending. PLAN :  1) Supportive care  2) BRAT diet  3) OK for d/c home  4) Follow up on results of biopsies and remaining lab work  5) Patient can follow up with our office (me or Guy Amor PA-C) in next several weeks  6) Anti-nausea meds OK for D/C  7) Stop cipro and flagyl since low suspicion for bacterial pathogen. Thank you for allowing me to participate in the care of your patient. Please feel free to contact me with any concerns.   [address] [place], [place] [address], DO

## 2021-07-17 NOTE — PLAN OF CARE
Problem: Falls - Risk of:  Goal: Will remain free from falls  Description: Will remain free from falls  7/17/2021 0153 by Patricia Moore RN  Outcome: Ongoing  7/16/2021 1753 by Lavell Husain RN  Outcome: Ongoing     Problem: Infection:  Goal: Will remain free from infection  Description: Will remain free from infection  7/17/2021 0153 by Patricia Moore RN  Outcome: Ongoing  7/16/2021 1753 by Lavell Husain RN  Outcome: Ongoing     Problem: Safety:  Goal: Free from accidental physical injury  Description: Free from accidental physical injury  7/17/2021 0153 by Patricia Moore RN  Outcome: Ongoing  7/16/2021 1753 by Lavell Husain RN  Outcome: Ongoing

## 2021-07-18 LAB
CALCITONIN LEVEL: <2 PG/ML (ref 0–5.1)
GASTRIN: 61 PG/ML (ref 0–100)

## 2021-07-19 LAB — TRYPTASE: 6.3 UG/L

## 2021-08-07 LAB — VASOACTIVE INTESTINAL POLYPEPTIDE PLASMA: <13 PG/ML (ref 0–60)

## 2021-08-11 LAB
REASON FOR REJECTION: NORMAL
REJECTED TEST: NORMAL

## 2021-08-16 RX ORDER — DICYCLOMINE HCL 20 MG
20 TABLET ORAL 3 TIMES DAILY
COMMUNITY

## 2021-08-16 RX ORDER — BUDESONIDE 3 MG/1
9 CAPSULE, COATED PELLETS ORAL EVERY MORNING
COMMUNITY

## 2021-08-16 NOTE — PROGRESS NOTES
[facility] time_____1000_______        Surgery time____1130________    Take the following medications with a sip of water:    Do not eat or drink anything after 12:00 midnight prior to your surgery. This includes water chewing gum, mints and ice chips. You may brush your teeth and gargle the morning of your surgery, but do not swallow the water     Please see your family doctor/pediatrician for a history and physical and/or concerning medications. Bring any test results/reports from your physicians office. If you are under the care of a heart doctor or specialist doctor, please be aware that you may be asked to them for clearance    You may be asked to stop blood thinners such as Coumadin, Plavix, Fragmin, Lovenox, etc., or any anti-inflammatories such as:  Aspirin, Ibuprofen, Advil, Naproxen prior to your surgery. We also ask that you stop any OTC medications such as fish oil, vitamin E, glucosamine, garlic, Multivitamins, COQ 10, etc.    We ask that you do not smoke 24 hours prior to surgery  We ask that you do not  drink any alcoholic beverages 24 hours prior to surgery     You must make arrangements for a responsible adult to take you home after your surgery. For your safety you will not be allowed to leave alone or drive yourself home. Your surgery will be cancelled if you do not have a ride home. Also for your safety, it is strongly suggested that someone stay with you the first 24 hours after your surgery. A parent or legal guardian must accompany a child scheduled for surgery and plan to stay at the hospital until the child is discharged. Please do not bring other children with you. For your comfort, please wear simple loose fitting clothing to the hospital.  Please do not bring valuables.     Do not wear any make-up or nail polish on your fingers or toes      For your safety, please do not wear any jewelry or body piercing's on the day of surgery. All jewelry must be removed. If you have dentures, they will be removed before going to operating room. For your convenience, we will provide you with a container. If you wear contact lenses or glasses, they will be removed, please bring a case for them. If you have a living will and a durable power of  for healthcare, please bring in a copy. As part of our patient safety program to minimize surgical site infections, we ask you to do the following:    · Please notify your surgeon if you develop any illness between         now and the  day of your surgery. · This includes a cough, cold, fever, sore throat, nausea,         or vomiting, and diarrhea, etc.  ·  Please notify your surgeon if you experience dizziness, shortness         of breath or blurred vision between now and the time of your surgery. Do not shave your operative site 96 hours prior to surgery. For face and neck surgery, men may use an electric razor 48 hours   prior to surgery. You may shower the night before surgery or the morning of   your surgery with an antibacterial soap. You will need to bring a photo ID and insurance card    [facility] has an onsite pharmacy, would you like to utilize our pharmacy     If you will be staying overnight and use a C-pap machine, please bring   your C-pap to hospital     Our goal is to provide you with excellent care, therefore, visitors will be limited to two(2) in the room at a time so that we may focus on providing this care for you. Please contact pre-admission testing if you have any further questions. [facility] phone number:  [phone] [facility] fax number:  [phone]  Please note these are generalized instructions for all surgical cases, you may be provided with more specific instructions according to your surgery.     Safety First!  Call before you fall!!

## 2021-08-16 NOTE — PROGRESS NOTES
C-Difficile admission screening and protocol:     * Admitted with diarrhea? yes     *Prior history of C-Diff. In last 3 months?no     *Antibiotic use in the past 6-8 weeks?yes     *Prior hospitalization or nursing home in the last month? no     Preoperative Screening for Elective Surgery/Invasive Procedures While COVID-19 present in the community     Have you tested positive or have been told to self-isolate for COVID-19 like symptoms within the past 28 days? no   Do you currently have any of the following symptoms?no  o Fever >100.0 F or 99.9 F in immunocompromised patients? o New onset cough, shortness of breath or difficulty breathing?  o New onset sore throat, myalgia (muscle aches and pains), headache, loss of taste/smell or diarrhea?  Have you had a potential exposure to COVID-19 within the past 14 days by:  o Close contact with a confirmed case?no  o Close contact with a healthcare worker,  or essential infrastructure worker (grocery store, [employer], gas station, public utilities or transportation)?no  o Do you reside in a congregate setting such as; skilled nursing facility, adult home, correctional facility, homeless shelter or other institutional setting?no  Have you had recent travel to a known COVID-19 hotspot? no  Indicate if the patient has a positive screen by answering yes to one or more of the above questions. Patients who test positive or screen positive prior to surgery or on the day of surgery should be evaluated in conjunction with the surgeon/proceduralist/anesthesiologist to determine the urgency of the procedure.

## 2021-08-16 NOTE — PROGRESS NOTES
Pt has pain pump.  MD Bentley Fess notified and states pt will be able to have this running throughout procedure

## 2021-08-18 ENCOUNTER — ANESTHESIA EVENT (OUTPATIENT)
Dept: ENDOSCOPY | Age: 61
End: 2021-08-18
Payer: MEDICARE

## 2021-08-19 ENCOUNTER — HOSPITAL ENCOUNTER (OUTPATIENT)
Age: 61
Setting detail: OUTPATIENT SURGERY
Discharge: HOME | End: 2021-08-19
Attending: INTERNAL MEDICINE | Admitting: INTERNAL MEDICINE
Payer: MEDICARE

## 2021-08-19 ENCOUNTER — ANESTHESIA (OUTPATIENT)
Dept: ENDOSCOPY | Age: 61
End: 2021-08-19
Payer: MEDICARE

## 2021-08-19 VITALS
SYSTOLIC BLOOD PRESSURE: 185 MMHG | DIASTOLIC BLOOD PRESSURE: 84 MMHG | OXYGEN SATURATION: 100 % | RESPIRATION RATE: 14 BRPM

## 2021-08-19 VITALS
DIASTOLIC BLOOD PRESSURE: 71 MMHG | SYSTOLIC BLOOD PRESSURE: 154 MMHG | WEIGHT: 153.22 LBS | BODY MASS INDEX: 27.15 KG/M2 | RESPIRATION RATE: 16 BRPM | HEIGHT: 63 IN | HEART RATE: 61 BPM | OXYGEN SATURATION: 95 % | TEMPERATURE: 97.6 F

## 2021-08-19 DIAGNOSIS — R13.10 DYSPHAGIA, UNSPECIFIED TYPE: ICD-10-CM

## 2021-08-19 PROCEDURE — 3700000001 HC ADD 15 MINUTES (ANESTHESIA): Performed by: INTERNAL MEDICINE

## 2021-08-19 PROCEDURE — 2580000003 HC RX 258: Performed by: ANESTHESIOLOGY

## 2021-08-19 PROCEDURE — 7100000000 HC PACU RECOVERY - FIRST 15 MIN: Performed by: INTERNAL MEDICINE

## 2021-08-19 PROCEDURE — 7100000001 HC PACU RECOVERY - ADDTL 15 MIN: Performed by: INTERNAL MEDICINE

## 2021-08-19 PROCEDURE — 3700000000 HC ANESTHESIA ATTENDED CARE: Performed by: INTERNAL MEDICINE

## 2021-08-19 PROCEDURE — 88305 TISSUE EXAM BY PATHOLOGIST: CPT

## 2021-08-19 PROCEDURE — 3609012400 HC EGD TRANSORAL BIOPSY SINGLE/MULTIPLE: Performed by: INTERNAL MEDICINE

## 2021-08-19 PROCEDURE — 3609015300 HC ESOPHAGEAL DILATION MALONEY: Performed by: INTERNAL MEDICINE

## 2021-08-19 PROCEDURE — 6360000002 HC RX W HCPCS: Performed by: NURSE ANESTHETIST, CERTIFIED REGISTERED

## 2021-08-19 PROCEDURE — 7100000011 HC PHASE II RECOVERY - ADDTL 15 MIN: Performed by: INTERNAL MEDICINE

## 2021-08-19 PROCEDURE — 7100000010 HC PHASE II RECOVERY - FIRST 15 MIN: Performed by: INTERNAL MEDICINE

## 2021-08-19 PROCEDURE — 2580000003 HC RX 258: Performed by: NURSE ANESTHETIST, CERTIFIED REGISTERED

## 2021-08-19 PROCEDURE — 2709999900 HC NON-CHARGEABLE SUPPLY: Performed by: INTERNAL MEDICINE

## 2021-08-19 PROCEDURE — 2500000003 HC RX 250 WO HCPCS: Performed by: NURSE ANESTHETIST, CERTIFIED REGISTERED

## 2021-08-19 RX ORDER — PROPOFOL 10 MG/ML
INJECTION, EMULSION INTRAVENOUS PRN
Status: DISCONTINUED | OUTPATIENT
Start: 2021-08-19 | End: 2021-08-19 | Stop reason: SDUPTHER

## 2021-08-19 RX ORDER — LABETALOL HYDROCHLORIDE 5 MG/ML
5 INJECTION, SOLUTION INTRAVENOUS EVERY 10 MIN PRN
Status: DISCONTINUED | OUTPATIENT
Start: 2021-08-19 | End: 2021-08-19 | Stop reason: HOSPADM

## 2021-08-19 RX ORDER — ONDANSETRON 2 MG/ML
4 INJECTION INTRAMUSCULAR; INTRAVENOUS
Status: DISCONTINUED | OUTPATIENT
Start: 2021-08-19 | End: 2021-08-19 | Stop reason: HOSPADM

## 2021-08-19 RX ORDER — SODIUM CHLORIDE 9 MG/ML
INJECTION, SOLUTION INTRAVENOUS CONTINUOUS PRN
Status: DISCONTINUED | OUTPATIENT
Start: 2021-08-19 | End: 2021-08-19 | Stop reason: SDUPTHER

## 2021-08-19 RX ORDER — PROMETHAZINE HYDROCHLORIDE 25 MG/ML
6.25 INJECTION, SOLUTION INTRAMUSCULAR; INTRAVENOUS
Status: DISCONTINUED | OUTPATIENT
Start: 2021-08-19 | End: 2021-08-19 | Stop reason: HOSPADM

## 2021-08-19 RX ORDER — SODIUM CHLORIDE 9 MG/ML
25 INJECTION, SOLUTION INTRAVENOUS PRN
Status: DISCONTINUED | OUTPATIENT
Start: 2021-08-19 | End: 2021-08-19 | Stop reason: HOSPADM

## 2021-08-19 RX ORDER — SODIUM CHLORIDE 0.9 % (FLUSH) 0.9 %
10 SYRINGE (ML) INJECTION PRN
Status: DISCONTINUED | OUTPATIENT
Start: 2021-08-19 | End: 2021-08-19 | Stop reason: HOSPADM

## 2021-08-19 RX ORDER — LIDOCAINE HYDROCHLORIDE 20 MG/ML
INJECTION, SOLUTION EPIDURAL; INFILTRATION; INTRACAUDAL; PERINEURAL PRN
Status: DISCONTINUED | OUTPATIENT
Start: 2021-08-19 | End: 2021-08-19 | Stop reason: SDUPTHER

## 2021-08-19 RX ORDER — SODIUM CHLORIDE 9 MG/ML
INJECTION, SOLUTION INTRAVENOUS CONTINUOUS
Status: DISCONTINUED | OUTPATIENT
Start: 2021-08-19 | End: 2021-08-19 | Stop reason: HOSPADM

## 2021-08-19 RX ORDER — GLYCOPYRROLATE 0.2 MG/ML
INJECTION INTRAMUSCULAR; INTRAVENOUS PRN
Status: DISCONTINUED | OUTPATIENT
Start: 2021-08-19 | End: 2021-08-19 | Stop reason: SDUPTHER

## 2021-08-19 RX ORDER — SODIUM CHLORIDE 0.9 % (FLUSH) 0.9 %
10 SYRINGE (ML) INJECTION EVERY 12 HOURS SCHEDULED
Status: DISCONTINUED | OUTPATIENT
Start: 2021-08-19 | End: 2021-08-19 | Stop reason: HOSPADM

## 2021-08-19 RX ADMIN — SODIUM CHLORIDE: 9 INJECTION, SOLUTION INTRAVENOUS at 12:25

## 2021-08-19 RX ADMIN — GLYCOPYRROLATE 0.2 MG: 0.2 INJECTION, SOLUTION INTRAMUSCULAR; INTRAVENOUS at 12:30

## 2021-08-19 RX ADMIN — PROPOFOL 20 MG: 10 INJECTION, EMULSION INTRAVENOUS at 12:41

## 2021-08-19 RX ADMIN — PROPOFOL 20 MG: 10 INJECTION, EMULSION INTRAVENOUS at 12:39

## 2021-08-19 RX ADMIN — PROPOFOL 40 MG: 10 INJECTION, EMULSION INTRAVENOUS at 12:35

## 2021-08-19 RX ADMIN — PROPOFOL 20 MG: 10 INJECTION, EMULSION INTRAVENOUS at 12:37

## 2021-08-19 RX ADMIN — PROPOFOL 70 MG: 10 INJECTION, EMULSION INTRAVENOUS at 12:32

## 2021-08-19 RX ADMIN — LIDOCAINE HYDROCHLORIDE 100 MG: 20 INJECTION, SOLUTION EPIDURAL; INFILTRATION; INTRACAUDAL; PERINEURAL at 12:32

## 2021-08-19 RX ADMIN — PROPOFOL 40 MG: 10 INJECTION, EMULSION INTRAVENOUS at 12:45

## 2021-08-19 RX ADMIN — PROPOFOL 20 MG: 10 INJECTION, EMULSION INTRAVENOUS at 12:36

## 2021-08-19 RX ADMIN — PROPOFOL 20 MG: 10 INJECTION, EMULSION INTRAVENOUS at 12:43

## 2021-08-19 RX ADMIN — SODIUM CHLORIDE: 9 INJECTION, SOLUTION INTRAVENOUS at 10:56

## 2021-08-19 RX ADMIN — PROPOFOL 20 MG: 10 INJECTION, EMULSION INTRAVENOUS at 12:47

## 2021-08-19 ASSESSMENT — PULMONARY FUNCTION TESTS
PIF_VALUE: 0
PIF_VALUE: 1
PIF_VALUE: 0

## 2021-08-19 ASSESSMENT — PAIN - FUNCTIONAL ASSESSMENT
PAIN_FUNCTIONAL_ASSESSMENT: ACTIVITIES ARE NOT PREVENTED
PAIN_FUNCTIONAL_ASSESSMENT: 0-10

## 2021-08-19 ASSESSMENT — PAIN SCALES - GENERAL
PAINLEVEL_OUTOF10: 0
PAINLEVEL_OUTOF10: 0

## 2021-08-19 ASSESSMENT — PAIN DESCRIPTION - DESCRIPTORS: DESCRIPTORS: ACHING;BURNING

## 2021-08-19 NOTE — PROGRESS NOTES
Pt tolerating po. Discharge instructions given to patient and her friend Heather Keating (via phone). Verbalized understanding. IV d/c'd.

## 2021-08-19 NOTE — ANESTHESIA PRE PROCEDURE
[facility] Department of Anesthesiology  Pre-Anesthesia Evaluation/Consultation       Name:  Maged Hubbard  : 1960  Age:  59 y.o. MRN:  [id number]  Date: 2021           Surgeon: Surgeon(s):  Arie Willett MD    Procedure: Procedure(s):  ESOPHAGOGASTRODUODENOSCOPY     Allergies   Allergen Reactions    Latex Rash    Pcn [Penicillins]      unsure     Patient Active Problem List   Diagnosis    Colitis     Past Medical History:   Diagnosis Date    Colitis     microscopic colitis    Depression     Hyperlipidemia     Hypertension     IBS (irritable bowel syndrome)     Migraines     RA (rheumatoid arthritis) (HCC)     RSD (reflex sympathetic dystrophy)      Past Surgical History:   Procedure Laterality Date    BACLOFEN PUMP IMPLANTATION Left     Pain pump    COLONOSCOPY N/A 2021    COLONOSCOPY WITH BIOPSY performed by DO Sarika at [facility] [address]  2021    COLONOSCOPY POLYPECTOMY SNARE/COLD BIOPSY performed by DO Sarika at [facility] Right     Arthroscopy / severed a nerve    NERVE SURGERY Right     Nerve clipping Rt leg above knee    SYMPATHECTOMY      TONSILLECTOMY      UPPER GASTROINTESTINAL ENDOSCOPY  10/03/2016    UPPER GASTROINTESTINAL ENDOSCOPY N/A 2021    EGD BIOPSY performed by DO Sarika at [facility] History     Tobacco Use    Smoking status: Never Smoker    Smokeless tobacco: Never Used   Vaping Use    Vaping Use: Never used   Substance Use Topics    Alcohol use: No    Drug use: No     Medications  No current facility-administered medications on file prior to encounter.      Current Outpatient Medications on File Prior to Encounter   Medication Sig Dispense Refill    dicyclomine (BENTYL) 20 MG tablet Take 20 mg by mouth 3 times daily      budesonide (ENTOCORT EC) 3 MG extended release capsule Take 9 mg by mouth every morning      abatacept (ORENCIA) 250 MG injection Infuse 125 mg intravenously once a week      vitamin D (CHOLECALCIFEROL) 25 MCG (1000 UT) TABS tablet Take 2,000 Units by mouth 2 times daily      omeprazole (PRILOSEC) 40 MG delayed release capsule Take 40 mg by mouth daily      tiZANidine (ZANAFLEX) 4 MG tablet Take 4 mg by mouth daily      pravastatin (PRAVACHOL) 20 MG tablet Take 20 mg by mouth daily      CLONIDINE HCL, ANALGESIA, EP 49.95 mcg by Intrathecal route daily Pain pump syringe 200.0 mcg / ml      lubiprostone (AMITIZA) 8 MCG CAPS capsule Take 8 mcg by mouth 2 times daily (with meals)      nadolol (CORGARD) 40 MG tablet Take 40 mg by mouth daily.  triamterene-hydrochlorothiazide (DYAZIDE) 37.5-25 MG per capsule Take 1 capsule by mouth every morning.  montelukast (SINGULAIR) 10 MG tablet Take 10 mg by mouth nightly.  escitalopram (LEXAPRO) 10 MG tablet Take 10 mg by mouth daily.  HYDROmorphone (DILAUDID) 10 MG/ML injection 4.995 mg by Intrathecal route daily Pain pump Syringe 20.0mg/ ml      baclofen (LIORESAL) 10 MG/20ML SOLN 149.85 mcg by Intrathecal route daily Pain pump syringe 600.0 mcg/ ml      ondansetron (ZOFRAN-ODT) 4 MG disintegrating tablet Take 1 tablet by mouth every 8 hours as needed for Nausea or Vomiting 60 tablet 1    lisinopril (PRINIVIL;ZESTRIL) 5 MG tablet Take 2.5 mg by mouth daily If SBP > 130      SUMAtriptan (IMITREX) 100 MG tablet Take 100 mg by mouth once as needed for Migraine.        Current Facility-Administered Medications   Medication Dose Route Frequency Provider Last Rate Last Admin    0.9 % sodium chloride infusion   Intravenous Continuous Dianah Moritz, MD        sodium chloride flush 0.9 % injection 10 mL  10 mL Intravenous 2 times per day Dianah Moritz, MD        sodium chloride flush 0.9 % injection 10 mL  10 mL Intravenous PRN Dianah Moritz, MD        0.9 % sodium chloride infusion  25 mL Intravenous PRN Dianah Moritz, MD Vital Signs (Current) There were no vitals filed for this visit. Vital Signs Statistics (for past 48 hrs)     No data recorded    BP Readings from Last 3 Encounters:   07/17/21 (!) 160/74   07/16/21 (!) 109/55   04/03/18 137/72     BMI  Body mass index is 26.57 kg/m². Estimated body mass index is 26.57 kg/m² as calculated from the following:    Height as of this encounter: 5' 3\" (1.6 m). Weight as of this encounter: 150 lb (68 kg). CBC   Lab Results   Component Value Date    WBC 3.3 07/16/2021    RBC 4.21 07/16/2021    HGB 11.7 07/16/2021    HCT 33.8 07/16/2021    MCV 80.2 07/16/2021    RDW 13.9 07/16/2021     07/16/2021     CMP    Lab Results   Component Value Date     07/16/2021    K 4.2 07/16/2021    K 3.4 07/14/2021     07/16/2021    CO2 25 07/16/2021    BUN 4 07/16/2021    CREATININE 0.7 07/16/2021    GFRAA >60 07/16/2021    AGRATIO 1.5 07/13/2021    LABGLOM >60 07/16/2021    GLUCOSE 94 07/16/2021    PROT 5.6 07/14/2021    CALCIUM 9.1 07/16/2021    BILITOT 0.5 07/14/2021    ALKPHOS 58 07/14/2021    AST 14 07/14/2021    ALT 7 07/14/2021     BMP    Lab Results   Component Value Date     07/16/2021    K 4.2 07/16/2021    K 3.4 07/14/2021     07/16/2021    CO2 25 07/16/2021    BUN 4 07/16/2021    CREATININE 0.7 07/16/2021    CALCIUM 9.1 07/16/2021    GFRAA >60 07/16/2021    LABGLOM >60 07/16/2021    GLUCOSE 94 07/16/2021     POCGlucose  No results for input(s): GLUCOSE in the last 72 hours.    Coags  No results found for: PROTIME, INR, APTT  HCG (If Applicable) No results found for: PREGTESTUR, PREGSERUM, HCG, HCGQUANT   ABGs No results found for: PHART, PO2ART, BRF4ECO, FXE3EXC, BEART, B8LERHOJ   Type & Screen (If Applicable)  No results found for: LABABO, LABRH                         BMI: Wt Readings from Last 3 Encounters:       NPO Status:   Date of last liquid consumption: 08/18/21   Time of last liquid consumption: 2300   Date of last solid food consumption: 08/18/21 Time of last solid consumption: 2200       Anesthesia Evaluation  Patient summary reviewed no history of anesthetic complications:   Airway: Mallampati: III  TM distance: >3 FB   Neck ROM: full   Dental:          Pulmonary:Negative Pulmonary ROS and normal exam                               Cardiovascular:  Exercise tolerance: good (>4 METS),   (+) hypertension:,       ECG reviewed  Rhythm: regular  Rate: normal           Beta Blocker:  Dose within 24 Hrs         Neuro/Psych:   (+) neuromuscular disease:, headaches:, psychiatric history:depression/anxiety             GI/Hepatic/Renal:   (+) GERD: well controlled,          ROS comment: IBS. Endo/Other:    (+) : arthritis:., .                  ROS comment: RSD Abdominal:             Vascular: negative vascular ROS. Other Findings: Poor dentition throughout              Anesthesia Plan      MAC     ASA 3       Induction: intravenous. Anesthetic plan and risks discussed with patient. Plan discussed with CRNA. This pre-anesthesia assessment may be used as a history and physical.    DOS STAFF ADDENDUM:    Pt seen and examined, chart reviewed (including anesthesia, drug and allergy history). No interval changes to history and physical examination. Anesthetic plan, risks, benefits, alternatives, and personnel involved discussed with patient. Questions and concerns addressed. Patient(family) verbalized an understanding and agrees to proceed.       Jessy Carlos MD  August 19, 2021  10:55 AM

## 2021-08-19 NOTE — OP NOTE
Endoscopy Note    Patient: Belkis Bond  : 1960  Acct#:     Procedure: Esophagogastroduodenoscopy with biopsy, esophageal dilation                         Date:  2021     Surgeon:   Mark Oshea MD    Referring Physician:  Malick Loera    Indications: This is a 59y.o. year old female who presents today with dysphagia. Postoperative Diagnosis:  1 cm hiatal hernia, gastric polyp     Anesthesia:  TIVA     Consent:  The patient or their legal guardian has signed an informed consent, and is aware of the potential risks, benefits, alternatives, and potential complications of this procedure. These include, but are not limited to hemorrhage, bleeding, post procedural pain, perforation, phlebitis, aspiration, hypotension, hypoxia, cardiovascular events such as arryhthmia, and possibly death. Description of Procedure: The patient was then taken to the endoscopy suite, placed in the left lateral decubitus position and the above IV sedation was administrered. The Olympus video endoscope was placed through the patient's oropharynx without difficulty to the extent of the 2nd portion of the duodenum. Both forward and retroflexed views of the stomach were obtained. Findings:    Esophagus: The esophagus appeared normal without evidence of Arredondo's esophagus or reflux esophagitis. A 1 cm hiatal hernia was visible. Proximal and distal esophageal biopsies were taken to rule out eosinophilic esophagitis, but there was no evidence of linear furrowing or trachealization of the esophagus. The mucosa did not feel fibrotic when biopsied. A 50 Fr and 52 Fr Kyle dilators were sequentially passed through the esophagus. There was no resistance, and post dilation endoscopic evaluation did not show any shearing of the mucosa. Blood on the dilators was present from the previously taken biopsies. Stomach: The gastric mucosa appeared normal on forward and retroflexed views.

## 2021-08-19 NOTE — H&P
Pre-operative History and Physical    Patient: Soniya Frost  : 1960  Acct#:     Intended Procedure:  EGD    HISTORY OF PRESENT ILLNESS:  The patient is a 59 y.o. female  who presents for/due to dysphagia       Past Medical History:        Diagnosis Date    Colitis     microscopic colitis    Depression     Hyperlipidemia     Hypertension     IBS (irritable bowel syndrome)     Migraines     RA (rheumatoid arthritis) (HCC)     RSD (reflex sympathetic dystrophy)      Past Surgical History:        Procedure Laterality Date    BACLOFEN PUMP IMPLANTATION Left     Pain pump    COLONOSCOPY N/A 2021    COLONOSCOPY WITH BIOPSY performed by Luna Cruz DO at [address]  2021    COLONOSCOPY POLYPECTOMY SNARE/COLD BIOPSY performed by Luna Cruz DO at [address] [facility] Right     Arthroscopy / severed a nerve    NERVE SURGERY Right     Nerve clipping Rt leg above knee    SYMPATHECTOMY      TONSILLECTOMY      UPPER GASTROINTESTINAL ENDOSCOPY  10/03/2016    UPPER GASTROINTESTINAL ENDOSCOPY N/A 2021    EGD BIOPSY performed by Luna Cruz DO at [facility] ENDOSCOPY     Medications Prior to Admission:   No current facility-administered medications on file prior to encounter.      Current Outpatient Medications on File Prior to Encounter   Medication Sig Dispense Refill    dicyclomine (BENTYL) 20 MG tablet Take 20 mg by mouth 3 times daily      budesonide (ENTOCORT EC) 3 MG extended release capsule Take 9 mg by mouth every morning      abatacept (ORENCIA) 250 MG injection Infuse 125 mg intravenously once a week      vitamin D (CHOLECALCIFEROL) 25 MCG (1000 UT) TABS tablet Take 2,000 Units by mouth 2 times daily      omeprazole (PRILOSEC) 40 MG delayed release capsule Take 40 mg by mouth daily      tiZANidine (ZANAFLEX) 4 MG tablet Take 4 mg by mouth daily      pravastatin (PRAVACHOL) 20 MG tablet Take 20 mg by mouth daily  CLONIDINE HCL, ANALGESIA, EP 49.95 mcg by Intrathecal route daily Pain pump syringe 200.0 mcg / ml      lubiprostone (AMITIZA) 8 MCG CAPS capsule Take 8 mcg by mouth 2 times daily (with meals)      nadolol (CORGARD) 40 MG tablet Take 40 mg by mouth daily.  triamterene-hydrochlorothiazide (DYAZIDE) 37.5-25 MG per capsule Take 1 capsule by mouth every morning.  montelukast (SINGULAIR) 10 MG tablet Take 10 mg by mouth nightly.  escitalopram (LEXAPRO) 10 MG tablet Take 10 mg by mouth daily.  HYDROmorphone (DILAUDID) 10 MG/ML injection 4.995 mg by Intrathecal route daily Pain pump Syringe 20.0mg/ ml      baclofen (LIORESAL) 10 MG/20ML SOLN 149.85 mcg by Intrathecal route daily Pain pump syringe 600.0 mcg/ ml      ondansetron (ZOFRAN-ODT) 4 MG disintegrating tablet Take 1 tablet by mouth every 8 hours as needed for Nausea or Vomiting 60 tablet 1    lisinopril (PRINIVIL;ZESTRIL) 5 MG tablet Take 2.5 mg by mouth daily If SBP > 130      SUMAtriptan (IMITREX) 100 MG tablet Take 100 mg by mouth once as needed for Migraine. Allergies:  Latex and Pcn [penicillins]    Social History:   TOBACCO:   reports that she has never smoked. She has never used smokeless tobacco.  ETOH:   reports no history of alcohol use. DRUGS:   reports no history of drug use. PHYSICAL EXAM:      Vital Signs: BP (!) 151/61   Pulse (!) 48   Temp 98.9 °F (37.2 °C) (Temporal)   Resp 16   Ht 5' 3\" (1.6 m)   Wt 153 lb 3.5 oz (69.5 kg)   SpO2 99%   BMI 27.14 kg/m²    Airway: No stridor or wheezing noted. Good air movement  Pulmonary: without wheezes.   Clear to auscultation  Cardiac:regular rate and rhythm without loud murmurs  Abdomen:soft, nontender,  Bowel sounds present    Pre-Procedure Assessment / Plan:  1) Dysphagia    ASA Grade:  ASA 3 - Patient with moderate systemic disease with functional limitations  Mallampati Classification:  Class III    Level of Sedation Plan:Deep sedation    Post Procedure plan: Return to same level of care    I assessed the patient and find that the patient is in satisfactory condition to proceed with the planned procedure and sedation plan. I have explained the risk, benefits, and alternatives to the procedure; the patient understands and agrees to proceed. The patient was counseled at length about the risks of rohan Covid-19 during their perioperative period and any recovery window from their procedure. The patient was made aware that rohan Covid-19  may worsen their prognosis for recovering from their procedure  and lend to a higher morbidity and/or mortality risk. All material risks, benefits, and reasonable alternatives including postponing the procedure were discussed. The patient does wish to proceed with the procedure at this time.       David Terry MD  8/19/2021

## 2021-08-22 NOTE — RESULT ENCOUNTER NOTE
Fundic gland polyps are benign and do not require follow up. She can follow up in the clinic with us PRN. Results will be conveyed through our office.

## 2022-05-23 ENCOUNTER — HOSPITAL ENCOUNTER (OUTPATIENT)
Dept: GENERAL RADIOLOGY | Age: 62
Discharge: HOME | End: 2022-05-23
Payer: MEDICARE

## 2022-05-23 ENCOUNTER — HOSPITAL ENCOUNTER (OUTPATIENT)
Age: 62
Discharge: HOME | End: 2022-05-23
Payer: MEDICARE

## 2022-05-23 DIAGNOSIS — M25.552 LEFT HIP PAIN: ICD-10-CM

## 2022-05-23 PROCEDURE — 72110 X-RAY EXAM L-2 SPINE 4/>VWS: CPT

## 2022-05-23 PROCEDURE — 73502 X-RAY EXAM HIP UNI 2-3 VIEWS: CPT

## 2023-12-28 ENCOUNTER — HOSPITAL ENCOUNTER (OUTPATIENT)
Dept: GENERAL RADIOLOGY | Age: 63
Discharge: HOME | End: 2023-12-28
Payer: MEDICARE

## 2023-12-28 ENCOUNTER — HOSPITAL ENCOUNTER (OUTPATIENT)
Age: 63
Discharge: HOME | End: 2023-12-28
Payer: MEDICARE

## 2023-12-28 DIAGNOSIS — M17.0 PRIMARY OSTEOARTHRITIS OF BOTH KNEES: ICD-10-CM

## 2023-12-28 DIAGNOSIS — M51.36 DEGENERATION OF LUMBAR INTERVERTEBRAL DISC: ICD-10-CM

## 2023-12-28 PROCEDURE — 73560 X-RAY EXAM OF KNEE 1 OR 2: CPT

## 2023-12-28 PROCEDURE — 72110 X-RAY EXAM L-2 SPINE 4/>VWS: CPT

## 2023-12-28 PROCEDURE — 73502 X-RAY EXAM HIP UNI 2-3 VIEWS: CPT

## 2025-05-02 ENCOUNTER — HOSPITAL ENCOUNTER (INPATIENT)
Age: 65
LOS: 1 days | Discharge: HOME | DRG: 074 | End: 2025-05-05
Attending: EMERGENCY MEDICINE | Admitting: PHYSICIAN ASSISTANT
Payer: MEDICARE

## 2025-05-02 ENCOUNTER — APPOINTMENT (OUTPATIENT)
Dept: CT IMAGING | Age: 65
DRG: 074 | End: 2025-05-02
Payer: MEDICARE

## 2025-05-02 DIAGNOSIS — G90.521 COMPLEX REGIONAL PAIN SYNDROME TYPE 1 OF RIGHT LOWER EXTREMITY: ICD-10-CM

## 2025-05-02 DIAGNOSIS — M79.604 RIGHT LEG PAIN: Primary | ICD-10-CM

## 2025-05-02 DIAGNOSIS — D84.9 IMMUNOSUPPRESSED STATUS: ICD-10-CM

## 2025-05-02 DIAGNOSIS — M54.50 BACK PAIN AT L4-L5 LEVEL: ICD-10-CM

## 2025-05-02 DIAGNOSIS — M54.50 ACUTE MIDLINE LOW BACK PAIN WITHOUT SCIATICA: ICD-10-CM

## 2025-05-02 PROBLEM — M25.569 KNEE PAIN: Status: ACTIVE | Noted: 2025-05-02

## 2025-05-02 LAB
ANION GAP SERPL CALCULATED.3IONS-SCNC: 9 MMOL/L (ref 3–16)
BASOPHILS # BLD: 0 K/UL (ref 0–0.2)
BASOPHILS NFR BLD: 0.2 %
BILIRUB UR QL STRIP.AUTO: NEGATIVE
BUN SERPL-MCNC: 16 MG/DL (ref 7–20)
CALCIUM SERPL-MCNC: 9.7 MG/DL (ref 8.3–10.6)
CHLORIDE SERPL-SCNC: 101 MMOL/L (ref 99–110)
CLARITY UR: CLEAR
CO2 SERPL-SCNC: 33 MMOL/L (ref 21–32)
COLOR UR: YELLOW
CREAT SERPL-MCNC: 0.6 MG/DL (ref 0.6–1.2)
CRP SERPL-MCNC: 20.4 MG/L (ref 0–5.1)
DEPRECATED RDW RBC AUTO: 13.5 % (ref 12.4–15.4)
EOSINOPHIL # BLD: 0 K/UL (ref 0–0.6)
EOSINOPHIL NFR BLD: 1.2 %
ERYTHROCYTE [SEDIMENTATION RATE] IN BLOOD BY WESTERGREN METHOD: 27 MM/HR (ref 0–30)
GFR SERPLBLD CREATININE-BSD FMLA CKD-EPI: >90 ML/MIN/{1.73_M2}
GLUCOSE SERPL-MCNC: 94 MG/DL (ref 70–99)
GLUCOSE UR STRIP.AUTO-MCNC: NEGATIVE MG/DL
HCT VFR BLD AUTO: 39 % (ref 36–48)
HGB BLD-MCNC: 13.2 G/DL (ref 12–16)
HGB UR QL STRIP.AUTO: NEGATIVE
KETONES UR STRIP.AUTO-MCNC: NEGATIVE MG/DL
LEUKOCYTE ESTERASE UR QL STRIP.AUTO: NEGATIVE
LYMPHOCYTES # BLD: 1.4 K/UL (ref 1–5.1)
LYMPHOCYTES NFR BLD: 33.5 %
MCH RBC QN AUTO: 28.2 PG (ref 26–34)
MCHC RBC AUTO-ENTMCNC: 33.8 G/DL (ref 31–36)
MCV RBC AUTO: 83.4 FL (ref 80–100)
MONOCYTES # BLD: 0.4 K/UL (ref 0–1.3)
MONOCYTES NFR BLD: 8.8 %
NEUTROPHILS # BLD: 2.3 K/UL (ref 1.7–7.7)
NEUTROPHILS NFR BLD: 56.3 %
NITRITE UR QL STRIP.AUTO: NEGATIVE
PH UR STRIP.AUTO: 6 [PH] (ref 5–8)
PLATELET # BLD AUTO: 160 K/UL (ref 135–450)
PMV BLD AUTO: 7.4 FL (ref 5–10.5)
POTASSIUM SERPL-SCNC: 3.4 MMOL/L (ref 3.5–5.1)
PROCALCITONIN SERPL IA-MCNC: 0.04 NG/ML (ref 0–0.15)
PROT UR STRIP.AUTO-MCNC: NEGATIVE MG/DL
RBC # BLD AUTO: 4.68 M/UL (ref 4–5.2)
SODIUM SERPL-SCNC: 143 MMOL/L (ref 136–145)
SP GR UR STRIP.AUTO: <=1.005 (ref 1–1.03)
UA DIPSTICK W REFLEX MICRO PNL UR: NORMAL
URN SPEC COLLECT METH UR: NORMAL
UROBILINOGEN UR STRIP-ACNC: 0.2 E.U./DL
WBC # BLD AUTO: 4.1 K/UL (ref 4–11)

## 2025-05-02 PROCEDURE — 6360000002 HC RX W HCPCS: Performed by: EMERGENCY MEDICINE

## 2025-05-02 PROCEDURE — 84145 PROCALCITONIN (PCT): CPT

## 2025-05-02 PROCEDURE — 85025 COMPLETE CBC W/AUTO DIFF WBC: CPT

## 2025-05-02 PROCEDURE — 94760 N-INVAS EAR/PLS OXIMETRY 1: CPT

## 2025-05-02 PROCEDURE — 96374 THER/PROPH/DIAG INJ IV PUSH: CPT

## 2025-05-02 PROCEDURE — 74176 CT ABD & PELVIS W/O CONTRAST: CPT

## 2025-05-02 PROCEDURE — 6370000000 HC RX 637 (ALT 250 FOR IP): Performed by: EMERGENCY MEDICINE

## 2025-05-02 PROCEDURE — 99285 EMERGENCY DEPT VISIT HI MDM: CPT

## 2025-05-02 PROCEDURE — 85652 RBC SED RATE AUTOMATED: CPT

## 2025-05-02 PROCEDURE — 36410 VNPNXR 3YR/> PHY/QHP DX/THER: CPT

## 2025-05-02 PROCEDURE — 80048 BASIC METABOLIC PNL TOTAL CA: CPT

## 2025-05-02 PROCEDURE — 86140 C-REACTIVE PROTEIN: CPT

## 2025-05-02 PROCEDURE — 81003 URINALYSIS AUTO W/O SCOPE: CPT

## 2025-05-02 RX ORDER — OXYCODONE HYDROCHLORIDE 5 MG/1
10 TABLET ORAL ONCE
Refills: 0 | Status: COMPLETED | OUTPATIENT
Start: 2025-05-02 | End: 2025-05-02

## 2025-05-02 RX ORDER — FOLIC ACID 1 MG/1
1 TABLET ORAL DAILY
COMMUNITY

## 2025-05-02 RX ORDER — ACETAMINOPHEN 500 MG
1000 TABLET ORAL ONCE
Status: COMPLETED | OUTPATIENT
Start: 2025-05-02 | End: 2025-05-02

## 2025-05-02 RX ORDER — LOSARTAN POTASSIUM 50 MG/1
50 TABLET ORAL DAILY
COMMUNITY

## 2025-05-02 RX ORDER — UPADACITINIB 15 MG/1
1 TABLET, EXTENDED RELEASE ORAL DAILY
COMMUNITY

## 2025-05-02 RX ORDER — ERGOCALCIFEROL 1.25 MG/1
50000 CAPSULE, LIQUID FILLED ORAL WEEKLY
COMMUNITY

## 2025-05-02 RX ORDER — METHYLNALTREXONE BROMIDE 150 MG/1
3 TABLET ORAL DAILY
COMMUNITY

## 2025-05-02 RX ORDER — HYDROMORPHONE HYDROCHLORIDE 1 MG/ML
1 INJECTION, SOLUTION INTRAMUSCULAR; INTRAVENOUS; SUBCUTANEOUS ONCE
Status: COMPLETED | OUTPATIENT
Start: 2025-05-02 | End: 2025-05-02

## 2025-05-02 RX ORDER — LIDOCAINE 4 G/G
1 PATCH TOPICAL ONCE
Status: COMPLETED | OUTPATIENT
Start: 2025-05-02 | End: 2025-05-03

## 2025-05-02 RX ORDER — POTASSIUM CHLORIDE 750 MG/1
10 TABLET, EXTENDED RELEASE ORAL ONCE
Status: COMPLETED | OUTPATIENT
Start: 2025-05-02 | End: 2025-05-02

## 2025-05-02 RX ADMIN — OXYCODONE 10 MG: 5 TABLET ORAL at 22:36

## 2025-05-02 RX ADMIN — ACETAMINOPHEN 1000 MG: 500 TABLET ORAL at 17:24

## 2025-05-02 RX ADMIN — POTASSIUM CHLORIDE 10 MEQ: 750 TABLET, EXTENDED RELEASE ORAL at 21:51

## 2025-05-02 RX ADMIN — HYDROMORPHONE HYDROCHLORIDE 1 MG: 1 INJECTION, SOLUTION INTRAMUSCULAR; INTRAVENOUS; SUBCUTANEOUS at 18:18

## 2025-05-02 ASSESSMENT — PAIN SCALES - GENERAL
PAINLEVEL_OUTOF10: 8
PAINLEVEL_OUTOF10: 8
PAINLEVEL_OUTOF10: 6
PAINLEVEL_OUTOF10: 9

## 2025-05-02 ASSESSMENT — PAIN DESCRIPTION - LOCATION: LOCATION: KNEE;BACK

## 2025-05-02 ASSESSMENT — PAIN DESCRIPTION - PAIN TYPE: TYPE: CHRONIC PAIN

## 2025-05-02 ASSESSMENT — PAIN - FUNCTIONAL ASSESSMENT: PAIN_FUNCTIONAL_ASSESSMENT: 0-10

## 2025-05-02 ASSESSMENT — PAIN DESCRIPTION - ORIENTATION: ORIENTATION: RIGHT

## 2025-05-02 NOTE — ED NOTES
Pt screaming out in pain,, pt coached to try slow breathing to help with the pain, asked not to scream pt updated on wait for the practioner.

## 2025-05-02 NOTE — ED PROVIDER NOTES
[facility] EMERGENCY DEPARTMENT  EMERGENCY DEPARTMENT ENCOUNTER        Pt Name: Xochitl Woody  MRN: [id number]  Birthdate 1960  Date of evaluation: 5/2/2025  Provider: Valeria Aguayo PA-C  PCP: Nancy Greenberg MD  Note Started: 5:42 PM EDT 5/2/25       I have seen and evaluated this patient with my supervising physician Gabino Smith MD.      CHIEF COMPLAINT       Chief Complaint   Patient presents with    Knee Pain     Pt states she has chronic regional pain syndrome and has a pain pump with \"high dose\" of baclofen and dilaudid states today her medications are not helping her pain, it starts in her knees and goes up her leg.       HISTORY OF PRESENT ILLNESS: 1 or more Elements     History From: Patient            Chief Complaint: Knee pain    Xochitl Woody is a 65 y.o. female who presents with severe pain that starts in her lower back and goes all the way down to the tip of her toes on her right side.  She states most of her pain is in her right knee.  Patient states she has a history of chronic regional pain syndrome, she has a pain pump with baclofen and Dilaudid.  She states her pain is uncontrolled today, she has electric shock sensations going throughout her body.  She describes this is not a new sensation, she experiences this when her pain is not controlled.  She states normally, she can handle the pain up to a level of an 8 out of 10, but this pain is severe and unable to be managed at home.  She follows with Dr. Palma for pain management.    Nursing Notes were all reviewed and agreed with or any disagreements were addressed in the HPI.    REVIEW OF SYSTEMS :      Review of Systems    Positives and Pertinent negatives as per HPI.     SURGICAL HISTORY     Past Surgical History:   Procedure Laterality Date    BACLOFEN PUMP IMPLANTATION Left     Pain pump    COLONOSCOPY N/A 7/16/2021    COLONOSCOPY WITH BIOPSY performed by Alexander Rowell Jr., DO at [facility] ENDOSCOPY

## 2025-05-03 ENCOUNTER — APPOINTMENT (OUTPATIENT)
Dept: MRI IMAGING | Age: 65
DRG: 074 | End: 2025-05-03
Payer: MEDICARE

## 2025-05-03 ENCOUNTER — APPOINTMENT (OUTPATIENT)
Dept: GENERAL RADIOLOGY | Age: 65
DRG: 074 | End: 2025-05-03
Payer: MEDICARE

## 2025-05-03 LAB
ANION GAP SERPL CALCULATED.3IONS-SCNC: 7 MMOL/L (ref 3–16)
BASOPHILS # BLD: 0 K/UL (ref 0–0.2)
BASOPHILS NFR BLD: 0.4 %
BUN SERPL-MCNC: 15 MG/DL (ref 7–20)
CALCIUM SERPL-MCNC: 9.1 MG/DL (ref 8.3–10.6)
CHLORIDE SERPL-SCNC: 104 MMOL/L (ref 99–110)
CO2 SERPL-SCNC: 32 MMOL/L (ref 21–32)
CREAT SERPL-MCNC: 0.7 MG/DL (ref 0.6–1.2)
DEPRECATED RDW RBC AUTO: 13.5 % (ref 12.4–15.4)
EOSINOPHIL # BLD: 0 K/UL (ref 0–0.6)
EOSINOPHIL NFR BLD: 1.4 %
GFR SERPLBLD CREATININE-BSD FMLA CKD-EPI: >90 ML/MIN/{1.73_M2}
GLUCOSE SERPL-MCNC: 97 MG/DL (ref 70–99)
HCT VFR BLD AUTO: 34.1 % (ref 36–48)
HGB BLD-MCNC: 11.8 G/DL (ref 12–16)
LYMPHOCYTES # BLD: 1.9 K/UL (ref 1–5.1)
LYMPHOCYTES NFR BLD: 57.3 %
MCH RBC QN AUTO: 28.3 PG (ref 26–34)
MCHC RBC AUTO-ENTMCNC: 34.6 G/DL (ref 31–36)
MCV RBC AUTO: 81.9 FL (ref 80–100)
MONOCYTES # BLD: 0.3 K/UL (ref 0–1.3)
MONOCYTES NFR BLD: 8.7 %
NEUTROPHILS # BLD: 1.1 K/UL (ref 1.7–7.7)
NEUTROPHILS NFR BLD: 32.2 %
PLATELET # BLD AUTO: 156 K/UL (ref 135–450)
PMV BLD AUTO: 7.2 FL (ref 5–10.5)
POTASSIUM SERPL-SCNC: 3.6 MMOL/L (ref 3.5–5.1)
RBC # BLD AUTO: 4.16 M/UL (ref 4–5.2)
SODIUM SERPL-SCNC: 143 MMOL/L (ref 136–145)
WBC # BLD AUTO: 3.3 K/UL (ref 4–11)

## 2025-05-03 PROCEDURE — 36415 COLL VENOUS BLD VENIPUNCTURE: CPT

## 2025-05-03 PROCEDURE — 85025 COMPLETE CBC W/AUTO DIFF WBC: CPT

## 2025-05-03 PROCEDURE — G0378 HOSPITAL OBSERVATION PER HR: HCPCS

## 2025-05-03 PROCEDURE — 6370000000 HC RX 637 (ALT 250 FOR IP): Performed by: INTERNAL MEDICINE

## 2025-05-03 PROCEDURE — 6360000002 HC RX W HCPCS: Performed by: INTERNAL MEDICINE

## 2025-05-03 PROCEDURE — 6370000000 HC RX 637 (ALT 250 FOR IP): Performed by: PHYSICIAN ASSISTANT

## 2025-05-03 PROCEDURE — 6360000002 HC RX W HCPCS: Performed by: PHYSICIAN ASSISTANT

## 2025-05-03 PROCEDURE — 80048 BASIC METABOLIC PNL TOTAL CA: CPT

## 2025-05-03 PROCEDURE — 96372 THER/PROPH/DIAG INJ SC/IM: CPT

## 2025-05-03 PROCEDURE — 2500000003 HC RX 250 WO HCPCS: Performed by: PHYSICIAN ASSISTANT

## 2025-05-03 PROCEDURE — A9577 INJ MULTIHANCE: HCPCS | Performed by: INTERNAL MEDICINE

## 2025-05-03 PROCEDURE — 96376 TX/PRO/DX INJ SAME DRUG ADON: CPT

## 2025-05-03 PROCEDURE — 72158 MRI LUMBAR SPINE W/O & W/DYE: CPT

## 2025-05-03 PROCEDURE — G0378 HOSPITAL OBSERVATION PER HR: HCPCS | Performed by: INTERNAL MEDICINE

## 2025-05-03 PROCEDURE — 6370000000 HC RX 637 (ALT 250 FOR IP): Performed by: NURSE PRACTITIONER

## 2025-05-03 PROCEDURE — 6360000004 HC RX CONTRAST MEDICATION: Performed by: INTERNAL MEDICINE

## 2025-05-03 PROCEDURE — 71046 X-RAY EXAM CHEST 2 VIEWS: CPT

## 2025-05-03 PROCEDURE — 96375 TX/PRO/DX INJ NEW DRUG ADDON: CPT

## 2025-05-03 RX ORDER — NALOXONE HYDROCHLORIDE 0.4 MG/ML
0.4 INJECTION, SOLUTION INTRAMUSCULAR; INTRAVENOUS; SUBCUTANEOUS PRN
Status: DISCONTINUED | OUTPATIENT
Start: 2025-05-03 | End: 2025-05-05 | Stop reason: HOSPADM

## 2025-05-03 RX ORDER — MAGNESIUM SULFATE IN WATER 40 MG/ML
2000 INJECTION, SOLUTION INTRAVENOUS PRN
Status: DISCONTINUED | OUTPATIENT
Start: 2025-05-03 | End: 2025-05-05 | Stop reason: HOSPADM

## 2025-05-03 RX ORDER — NADOLOL 20 MG/1
40 TABLET ORAL DAILY
Status: DISCONTINUED | OUTPATIENT
Start: 2025-05-03 | End: 2025-05-05 | Stop reason: HOSPADM

## 2025-05-03 RX ORDER — SENNOSIDES A AND B 8.6 MG/1
1 TABLET, FILM COATED ORAL DAILY PRN
Status: DISCONTINUED | OUTPATIENT
Start: 2025-05-03 | End: 2025-05-05 | Stop reason: HOSPADM

## 2025-05-03 RX ORDER — ACETAMINOPHEN 650 MG/1
650 SUPPOSITORY RECTAL EVERY 6 HOURS PRN
Status: DISCONTINUED | OUTPATIENT
Start: 2025-05-03 | End: 2025-05-05 | Stop reason: HOSPADM

## 2025-05-03 RX ORDER — GUAIFENESIN 600 MG/1
600 TABLET, EXTENDED RELEASE ORAL 2 TIMES DAILY
Status: DISCONTINUED | OUTPATIENT
Start: 2025-05-03 | End: 2025-05-05 | Stop reason: HOSPADM

## 2025-05-03 RX ORDER — DEXAMETHASONE SODIUM PHOSPHATE 4 MG/ML
6 INJECTION, SOLUTION INTRA-ARTICULAR; INTRALESIONAL; INTRAMUSCULAR; INTRAVENOUS; SOFT TISSUE EVERY 24 HOURS
Status: DISCONTINUED | OUTPATIENT
Start: 2025-05-04 | End: 2025-05-03

## 2025-05-03 RX ORDER — LOSARTAN POTASSIUM 25 MG/1
50 TABLET ORAL DAILY
Status: DISCONTINUED | OUTPATIENT
Start: 2025-05-03 | End: 2025-05-05 | Stop reason: HOSPADM

## 2025-05-03 RX ORDER — ESCITALOPRAM OXALATE 10 MG/1
10 TABLET ORAL DAILY
Status: DISCONTINUED | OUTPATIENT
Start: 2025-05-03 | End: 2025-05-05 | Stop reason: HOSPADM

## 2025-05-03 RX ORDER — SODIUM CHLORIDE 9 MG/ML
INJECTION, SOLUTION INTRAVENOUS PRN
Status: DISCONTINUED | OUTPATIENT
Start: 2025-05-03 | End: 2025-05-05 | Stop reason: HOSPADM

## 2025-05-03 RX ORDER — POTASSIUM CHLORIDE 7.45 MG/ML
10 INJECTION INTRAVENOUS PRN
Status: DISCONTINUED | OUTPATIENT
Start: 2025-05-03 | End: 2025-05-05 | Stop reason: HOSPADM

## 2025-05-03 RX ORDER — OXYCODONE AND ACETAMINOPHEN 5; 325 MG/1; MG/1
1 TABLET ORAL ONCE
Refills: 0 | Status: COMPLETED | OUTPATIENT
Start: 2025-05-03 | End: 2025-05-03

## 2025-05-03 RX ORDER — ONDANSETRON 2 MG/ML
4 INJECTION INTRAMUSCULAR; INTRAVENOUS EVERY 6 HOURS PRN
Status: DISCONTINUED | OUTPATIENT
Start: 2025-05-03 | End: 2025-05-05 | Stop reason: HOSPADM

## 2025-05-03 RX ORDER — HYDROMORPHONE HYDROCHLORIDE 1 MG/ML
0.5 INJECTION, SOLUTION INTRAMUSCULAR; INTRAVENOUS; SUBCUTANEOUS
Refills: 0 | Status: DISCONTINUED | OUTPATIENT
Start: 2025-05-03 | End: 2025-05-05 | Stop reason: HOSPADM

## 2025-05-03 RX ORDER — MONTELUKAST SODIUM 10 MG/1
10 TABLET ORAL NIGHTLY
Status: DISCONTINUED | OUTPATIENT
Start: 2025-05-03 | End: 2025-05-05 | Stop reason: HOSPADM

## 2025-05-03 RX ORDER — PANTOPRAZOLE SODIUM 40 MG/1
40 TABLET, DELAYED RELEASE ORAL
Status: DISCONTINUED | OUTPATIENT
Start: 2025-05-03 | End: 2025-05-05 | Stop reason: HOSPADM

## 2025-05-03 RX ORDER — ENOXAPARIN SODIUM 100 MG/ML
40 INJECTION SUBCUTANEOUS DAILY
Status: DISCONTINUED | OUTPATIENT
Start: 2025-05-03 | End: 2025-05-05 | Stop reason: HOSPADM

## 2025-05-03 RX ORDER — HYDROMORPHONE HYDROCHLORIDE 1 MG/ML
1 INJECTION, SOLUTION INTRAMUSCULAR; INTRAVENOUS; SUBCUTANEOUS
Refills: 0 | Status: DISCONTINUED | OUTPATIENT
Start: 2025-05-03 | End: 2025-05-05 | Stop reason: HOSPADM

## 2025-05-03 RX ORDER — DEXAMETHASONE SODIUM PHOSPHATE 4 MG/ML
6 INJECTION, SOLUTION INTRA-ARTICULAR; INTRALESIONAL; INTRAMUSCULAR; INTRAVENOUS; SOFT TISSUE EVERY 8 HOURS
Status: DISCONTINUED | OUTPATIENT
Start: 2025-05-03 | End: 2025-05-03

## 2025-05-03 RX ORDER — SODIUM CHLORIDE 0.9 % (FLUSH) 0.9 %
5-40 SYRINGE (ML) INJECTION PRN
Status: DISCONTINUED | OUTPATIENT
Start: 2025-05-03 | End: 2025-05-05 | Stop reason: HOSPADM

## 2025-05-03 RX ORDER — ACETAMINOPHEN 325 MG/1
650 TABLET ORAL EVERY 6 HOURS PRN
Status: DISCONTINUED | OUTPATIENT
Start: 2025-05-03 | End: 2025-05-05 | Stop reason: HOSPADM

## 2025-05-03 RX ORDER — SODIUM CHLORIDE 0.9 % (FLUSH) 0.9 %
5-40 SYRINGE (ML) INJECTION EVERY 12 HOURS SCHEDULED
Status: DISCONTINUED | OUTPATIENT
Start: 2025-05-03 | End: 2025-05-05 | Stop reason: HOSPADM

## 2025-05-03 RX ORDER — PRAVASTATIN SODIUM 20 MG
20 TABLET ORAL NIGHTLY
Status: DISCONTINUED | OUTPATIENT
Start: 2025-05-03 | End: 2025-05-05 | Stop reason: HOSPADM

## 2025-05-03 RX ORDER — POTASSIUM CHLORIDE 1500 MG/1
40 TABLET, EXTENDED RELEASE ORAL PRN
Status: DISCONTINUED | OUTPATIENT
Start: 2025-05-03 | End: 2025-05-05 | Stop reason: HOSPADM

## 2025-05-03 RX ORDER — TRIAMTERENE AND HYDROCHLOROTHIAZIDE 37.5; 25 MG/1; MG/1
1 TABLET ORAL EVERY MORNING
Status: DISCONTINUED | OUTPATIENT
Start: 2025-05-03 | End: 2025-05-05 | Stop reason: HOSPADM

## 2025-05-03 RX ADMIN — PRAVASTATIN SODIUM 20 MG: 20 TABLET ORAL at 20:12

## 2025-05-03 RX ADMIN — LOSARTAN POTASSIUM 50 MG: 25 TABLET, FILM COATED ORAL at 01:14

## 2025-05-03 RX ADMIN — SODIUM CHLORIDE, PRESERVATIVE FREE 10 ML: 5 INJECTION INTRAVENOUS at 08:40

## 2025-05-03 RX ADMIN — GADOBENATE DIMEGLUMINE 12 ML: 529 INJECTION, SOLUTION INTRAVENOUS at 14:05

## 2025-05-03 RX ADMIN — HYDROMORPHONE HYDROCHLORIDE 1 MG: 1 INJECTION, SOLUTION INTRAMUSCULAR; INTRAVENOUS; SUBCUTANEOUS at 08:36

## 2025-05-03 RX ADMIN — MONTELUKAST SODIUM 10 MG: 10 TABLET, FILM COATED ORAL at 20:12

## 2025-05-03 RX ADMIN — NADOLOL 40 MG: 20 TABLET ORAL at 09:33

## 2025-05-03 RX ADMIN — HYDROMORPHONE HYDROCHLORIDE 1 MG: 1 INJECTION, SOLUTION INTRAMUSCULAR; INTRAVENOUS; SUBCUTANEOUS at 03:09

## 2025-05-03 RX ADMIN — GUAIFENESIN 600 MG: 600 TABLET, EXTENDED RELEASE ORAL at 11:39

## 2025-05-03 RX ADMIN — PANTOPRAZOLE SODIUM 40 MG: 40 TABLET, DELAYED RELEASE ORAL at 05:34

## 2025-05-03 RX ADMIN — OXYCODONE HYDROCHLORIDE AND ACETAMINOPHEN 1 TABLET: 5; 325 TABLET ORAL at 18:46

## 2025-05-03 RX ADMIN — ESCITALOPRAM OXALATE 10 MG: 10 TABLET ORAL at 01:14

## 2025-05-03 RX ADMIN — PRAVASTATIN SODIUM 20 MG: 20 TABLET ORAL at 01:14

## 2025-05-03 RX ADMIN — ENOXAPARIN SODIUM 40 MG: 100 INJECTION SUBCUTANEOUS at 11:39

## 2025-05-03 RX ADMIN — TIZANIDINE 2 MG: 4 TABLET ORAL at 09:33

## 2025-05-03 RX ADMIN — MONTELUKAST SODIUM 10 MG: 10 TABLET, FILM COATED ORAL at 01:14

## 2025-05-03 RX ADMIN — DEXAMETHASONE SODIUM PHOSPHATE 6 MG: 4 INJECTION INTRA-ARTICULAR; INTRALESIONAL; INTRAMUSCULAR; INTRAVENOUS; SOFT TISSUE at 11:39

## 2025-05-03 RX ADMIN — GUAIFENESIN 600 MG: 600 TABLET, EXTENDED RELEASE ORAL at 20:12

## 2025-05-03 RX ADMIN — NALOXEGOL OXALATE 25 MG: 25 TABLET, FILM COATED ORAL at 05:34

## 2025-05-03 RX ADMIN — TRIAMTERENE AND HYDROCHLOROTHIAZIDE 1 TABLET: 37.5; 25 TABLET ORAL at 09:33

## 2025-05-03 ASSESSMENT — PAIN DESCRIPTION - ORIENTATION
ORIENTATION: RIGHT

## 2025-05-03 ASSESSMENT — PAIN DESCRIPTION - DESCRIPTORS
DESCRIPTORS: ACHING
DESCRIPTORS: BURNING;TIGHTNESS
DESCRIPTORS: BURNING;STABBING

## 2025-05-03 ASSESSMENT — PAIN SCALES - GENERAL
PAINLEVEL_OUTOF10: 7
PAINLEVEL_OUTOF10: 10
PAINLEVEL_OUTOF10: 5
PAINLEVEL_OUTOF10: 8
PAINLEVEL_OUTOF10: 2

## 2025-05-03 ASSESSMENT — PAIN DESCRIPTION - PAIN TYPE: TYPE: CHRONIC PAIN

## 2025-05-03 ASSESSMENT — PAIN DESCRIPTION - LOCATION
LOCATION: LEG;BACK
LOCATION: BACK;KNEE
LOCATION: KNEE

## 2025-05-03 NOTE — H&P
Hospital Medicine History & Physical        Name:  Xochitl Woody /Age/Sex: 1960  (65 y.o. female)   MRN & CSN:  [id number] & [id number] Encounter Date/Time: 2025 11:31 PM EDT   Location:  ED- PCP: Nancy Greenberg MD         CHIEF COMPLAINT:   Chief Complaint   Patient presents with    Knee Pain     Pt states she has chronic regional pain syndrome and has a pain pump with \"high dose\" of baclofen and dilaudid states today her medications are not helping her pain, it starts in her knees and goes up her leg.         HISTORY OF PRESENT ILLNESS:      History from: patient  Limitations to history : None     Xochitl Woody is a 65 y.o. female who presented to ED for evaluation of severe lower back pain and right knee pain.  Patient reports the majority of her pain is in her right knee.  She reports a history of chronic regional pain syndrome and has a pain pump with baclofen and Dilaudid.  She reports that her pain is uncontrolled today.  She reports feelings of electric shock sensations going throughout her body.  She reports this pain is typical for what she experiences when her pain is not controlled.  She reports that normally she can handle the pain up to a level of 8/10 but today the pain is severe and unable to be managed at home.  She is followed by Dr. Palma for pain management.  She denies any numbness/tingling/weakness to extremities.  She denies saddle anesthesia.  She denies bowel or bladder incontinence or retention.        REVIEW OF SYSTEMS:   Pertinent positives as noted in the HPI. All other systems reviewed and negative.      PHYSICAL EXAM PERFORMED:  /89   Pulse 66   Temp 98.4 °F (36.9 °C) (Temporal)   Resp 18   Ht 1.6 m (5' 3\")   Wt 65.8 kg (145 lb)   SpO2 94%   BMI 25.69 kg/m²     General appearance:  Awake, alert, no apparent distress  HEENT:  Normocephalic, atraumatic without obvious deformity. PERRL. EOM intact. Conjunctivae/corneas

## 2025-05-03 NOTE — CONSULTS
Paged regarding Xochitl Woody. 66 yo woman presenting with lower lower back and knee pain. Has CRPS with baclofen/pain pump in place. Per report, denies any new weakness, numbness or tingling.     MRI lumbar spine reviewed. Multilevel degenerative changes. No high grade central canal, lateral recess, or foraminal stenosis. Radiology read pending.     Recommend medical management for acute pain. If concern for pump malfunction, defer to Dr. Palma. If patient requires neurosurgical evaluation over the weekend, patient would have to be transferred to [facility].     Efrain Orellana  Neurosurgeon  [facility]  [phone]  3:50 PM

## 2025-05-03 NOTE — ED NOTES
Patient Name: Xochitl Woody  : 1960 65 y.o.  MRN: [id number]  ED Room #: ED-0022/     Chief complaint:   Chief Complaint   Patient presents with    Knee Pain     Pt states she has chronic regional pain syndrome and has a pain pump with \"high dose\" of baclofen and dilaudid states today her medications are not helping her pain, it starts in her knees and goes up her leg.     Hospital Problem/Diagnosis:   Hospital Problems           Last Modified POA    * (Principal) Knee pain 2025 Yes         O2 Flow Rate:O2 Device: None (Room air)   (if applicable)  Cardiac Rhythm:   (if applicable)  Active LDA's:   Peripheral IV 25 Left Forearm (Active)            How does patient ambulate? Stand by assist    2. How does patient take pills? Whole with Water    3. Is patient alert? Alert    4. Is patient oriented? To Person, To Place, To Time, To Situation, and Follows Commands    5.   Patient arrived from:  home  Facility Name: ___________________________________________    6. If patient is disoriented or from a Skill Nursing Facility has family been notified of admission?  na    7. Patient belongings? Belongings: Clothing    Disposition of belongings? Kept with Patient     8. Any specific patient or family belongings/needs/dynamics?   a. none    9. Miscellaneous comments/pending orders?  a. See admit       If there are any additional questions please reach out to the Emergency Department.

## 2025-05-03 NOTE — ED PROVIDER NOTES
EMERGENCY DEPARTMENT SUPERVISING PHYSICIAN NOTE    I have seen this patient & have reviewed history and findings with the PA, NP, or resident physician and provided direct supervision. I saw the patient and performed a substantive portion of the visit. I was present for key portions of any procedures performed. I've participated in medical management, monitoring, and treatment of this patient with the provider. I have reviewed currently available documentation, test results, and laboratory results in the interim. Care plan has been developed collaboratively. I take responsibility for the patient's management from when I was asked to get involved in this patient's care. Valeria and NAHUM are the primary clinicians of record.    Brief HPI:  65F states that she has CRPS type 1 and RA  Reports intense pain to her right knee that feels quite consistent with pain she has experienced previously related to CRPS and/or RA  Separately, she reports moderately intense pain to the center of her lower back onset this afternoon  This back pain feels fairly out of the ordinary for her  She has an intrathecal medication pump present  She receives abatacept regularly and was also on upadacitinib recently    Pertinent Exam Findings:  Awake, alert, appears uncomfortable, not distressed  CTAB, RRR, 2+ right radial pulse  Abdomen soft, NT, ND  No midline low back TTP  RUE - 5/5 strength, normal bulk and tone, no tremor  RLE - 5/5 strength, normal bulk and tone, no tremor  LUE - 5/5 strength, normal bulk and tone, no tremor  LLE - 5/5 strength, normal bulk and tone, no tremor  Sensation to light touch intact and equal all extremities    Results for orders placed or performed during the hospital encounter of 05/02/25   CBC with Auto Differential   Result Value Ref Range    WBC 4.1 4.0 - 11.0 K/uL    RBC 4.68 4.00 - 5.20 M/uL    Hemoglobin 13.2 12.0 - 16.0 g/dL    Hematocrit 39.0 36.0 - 48.0 %    MCV 83.4 80.0 - 100.0 fL    MCH 28.2 26.0 - 34.0

## 2025-05-04 LAB
ANION GAP SERPL CALCULATED.3IONS-SCNC: 7 MMOL/L (ref 3–16)
BASOPHILS # BLD: 0 K/UL (ref 0–0.2)
BASOPHILS NFR BLD: 0.3 %
BUN SERPL-MCNC: 21 MG/DL (ref 7–20)
CALCIUM SERPL-MCNC: 9.6 MG/DL (ref 8.3–10.6)
CHLORIDE SERPL-SCNC: 102 MMOL/L (ref 99–110)
CO2 SERPL-SCNC: 29 MMOL/L (ref 21–32)
CREAT SERPL-MCNC: 0.7 MG/DL (ref 0.6–1.2)
DEPRECATED RDW RBC AUTO: 13.4 % (ref 12.4–15.4)
EOSINOPHIL # BLD: 0 K/UL (ref 0–0.6)
EOSINOPHIL NFR BLD: 0.1 %
GFR SERPLBLD CREATININE-BSD FMLA CKD-EPI: >90 ML/MIN/{1.73_M2}
GLUCOSE SERPL-MCNC: 106 MG/DL (ref 70–99)
HCT VFR BLD AUTO: 36.4 % (ref 36–48)
HGB BLD-MCNC: 12.5 G/DL (ref 12–16)
LYMPHOCYTES # BLD: 1.5 K/UL (ref 1–5.1)
LYMPHOCYTES NFR BLD: 38.4 %
MCH RBC QN AUTO: 28.1 PG (ref 26–34)
MCHC RBC AUTO-ENTMCNC: 34.2 G/DL (ref 31–36)
MCV RBC AUTO: 82.1 FL (ref 80–100)
MONOCYTES # BLD: 0.4 K/UL (ref 0–1.3)
MONOCYTES NFR BLD: 9.3 %
NEUTROPHILS # BLD: 2 K/UL (ref 1.7–7.7)
NEUTROPHILS NFR BLD: 51.9 %
PLATELET # BLD AUTO: 180 K/UL (ref 135–450)
PMV BLD AUTO: 7.3 FL (ref 5–10.5)
POTASSIUM SERPL-SCNC: 3.8 MMOL/L (ref 3.5–5.1)
RBC # BLD AUTO: 4.44 M/UL (ref 4–5.2)
SODIUM SERPL-SCNC: 138 MMOL/L (ref 136–145)
WBC # BLD AUTO: 3.9 K/UL (ref 4–11)

## 2025-05-04 PROCEDURE — 85025 COMPLETE CBC W/AUTO DIFF WBC: CPT

## 2025-05-04 PROCEDURE — 80048 BASIC METABOLIC PNL TOTAL CA: CPT

## 2025-05-04 PROCEDURE — 6370000000 HC RX 637 (ALT 250 FOR IP): Performed by: NURSE PRACTITIONER

## 2025-05-04 PROCEDURE — 36415 COLL VENOUS BLD VENIPUNCTURE: CPT

## 2025-05-04 PROCEDURE — G0378 HOSPITAL OBSERVATION PER HR: HCPCS

## 2025-05-04 PROCEDURE — 6370000000 HC RX 637 (ALT 250 FOR IP): Performed by: INTERNAL MEDICINE

## 2025-05-04 PROCEDURE — 6370000000 HC RX 637 (ALT 250 FOR IP): Performed by: PHYSICIAN ASSISTANT

## 2025-05-04 PROCEDURE — 6360000002 HC RX W HCPCS: Performed by: PHYSICIAN ASSISTANT

## 2025-05-04 PROCEDURE — 2500000003 HC RX 250 WO HCPCS: Performed by: PHYSICIAN ASSISTANT

## 2025-05-04 PROCEDURE — 96372 THER/PROPH/DIAG INJ SC/IM: CPT

## 2025-05-04 RX ADMIN — NALOXEGOL OXALATE 25 MG: 25 TABLET, FILM COATED ORAL at 05:21

## 2025-05-04 RX ADMIN — SODIUM CHLORIDE, PRESERVATIVE FREE 10 ML: 5 INJECTION INTRAVENOUS at 08:14

## 2025-05-04 RX ADMIN — MONTELUKAST SODIUM 10 MG: 10 TABLET, FILM COATED ORAL at 19:40

## 2025-05-04 RX ADMIN — NADOLOL 40 MG: 20 TABLET ORAL at 08:13

## 2025-05-04 RX ADMIN — SENNOSIDES 8.6 MG: 8.6 TABLET, COATED ORAL at 08:20

## 2025-05-04 RX ADMIN — GUAIFENESIN 600 MG: 600 TABLET, EXTENDED RELEASE ORAL at 08:13

## 2025-05-04 RX ADMIN — SODIUM CHLORIDE, PRESERVATIVE FREE 10 ML: 5 INJECTION INTRAVENOUS at 19:50

## 2025-05-04 RX ADMIN — PRAVASTATIN SODIUM 20 MG: 20 TABLET ORAL at 19:41

## 2025-05-04 RX ADMIN — GUAIFENESIN 600 MG: 600 TABLET, EXTENDED RELEASE ORAL at 19:40

## 2025-05-04 RX ADMIN — LOSARTAN POTASSIUM 50 MG: 25 TABLET, FILM COATED ORAL at 08:14

## 2025-05-04 RX ADMIN — PANTOPRAZOLE SODIUM 40 MG: 40 TABLET, DELAYED RELEASE ORAL at 05:21

## 2025-05-04 RX ADMIN — ENOXAPARIN SODIUM 40 MG: 100 INJECTION SUBCUTANEOUS at 08:14

## 2025-05-04 RX ADMIN — ESCITALOPRAM OXALATE 10 MG: 10 TABLET ORAL at 08:14

## 2025-05-04 RX ADMIN — ACETAMINOPHEN 650 MG: 325 TABLET ORAL at 08:20

## 2025-05-04 ASSESSMENT — PAIN DESCRIPTION - LOCATION: LOCATION: BACK

## 2025-05-04 ASSESSMENT — PAIN SCALES - GENERAL
PAINLEVEL_OUTOF10: 7
PAINLEVEL_OUTOF10: 7

## 2025-05-04 NOTE — PLAN OF CARE
Problem: Pain  Goal: Verbalizes/displays adequate comfort level or baseline comfort level  5/4/2025 0838 by Naz Kern RN  Outcome: Progressing  5/3/2025 2119 by Jordana Peraza RN  Outcome: Progressing     Problem: Discharge Planning  Goal: Discharge to home or other facility with appropriate resources  5/4/2025 0838 by Naz Kern RN  Outcome: Progressing  5/3/2025 2119 by Jordana Peraza RN  Outcome: Progressing     Problem: Safety - Adult  Goal: Free from fall injury  5/4/2025 0838 by Naz Kern RN  Outcome: Progressing  5/3/2025 2119 by Jordana Peraza RN  Outcome: Progressing     Problem: ABCDS Injury Assessment  Goal: Absence of physical injury  5/4/2025 0838 by Naz Kern RN  Outcome: Progressing  5/3/2025 2119 by Jordana Peraza RN  Outcome: Progressing

## 2025-05-04 NOTE — PLAN OF CARE
Problem: Pain  Goal: Verbalizes/displays adequate comfort level or baseline comfort level  5/3/2025 2119 by Jordana Peraza RN  Outcome: Progressing  5/3/2025 1205 by Naz Kern RN  Outcome: Progressing     Problem: Discharge Planning  Goal: Discharge to home or other facility with appropriate resources  5/3/2025 2119 by Jordana Peraza RN  Outcome: Progressing  5/3/2025 1205 by Naz Kern RN  Outcome: Progressing     Problem: Safety - Adult  Goal: Free from fall injury  5/3/2025 2119 by Jordana Peraza RN  Outcome: Progressing  5/3/2025 1205 by Naz Kern RN  Outcome: Progressing     Problem: ABCDS Injury Assessment  Goal: Absence of physical injury  5/3/2025 2119 by Jordana Peraza RN  Outcome: Progressing  5/3/2025 1205 by Naz Kern RN  Outcome: Progressing

## 2025-05-05 VITALS
DIASTOLIC BLOOD PRESSURE: 62 MMHG | RESPIRATION RATE: 16 BRPM | TEMPERATURE: 98.1 F | HEART RATE: 58 BPM | OXYGEN SATURATION: 97 % | SYSTOLIC BLOOD PRESSURE: 145 MMHG | WEIGHT: 146.16 LBS | BODY MASS INDEX: 25.9 KG/M2 | HEIGHT: 63 IN

## 2025-05-05 PROBLEM — M54.50 BACK PAIN AT L4-L5 LEVEL: Status: ACTIVE | Noted: 2025-05-05

## 2025-05-05 PROCEDURE — 96372 THER/PROPH/DIAG INJ SC/IM: CPT

## 2025-05-05 PROCEDURE — 97530 THERAPEUTIC ACTIVITIES: CPT

## 2025-05-05 PROCEDURE — G0378 HOSPITAL OBSERVATION PER HR: HCPCS

## 2025-05-05 PROCEDURE — 2500000003 HC RX 250 WO HCPCS: Performed by: PHYSICIAN ASSISTANT

## 2025-05-05 PROCEDURE — 97116 GAIT TRAINING THERAPY: CPT

## 2025-05-05 PROCEDURE — 6370000000 HC RX 637 (ALT 250 FOR IP): Performed by: PHYSICIAN ASSISTANT

## 2025-05-05 PROCEDURE — 1200000000 HC SEMI PRIVATE

## 2025-05-05 PROCEDURE — 6370000000 HC RX 637 (ALT 250 FOR IP): Performed by: NURSE PRACTITIONER

## 2025-05-05 PROCEDURE — 97162 PT EVAL MOD COMPLEX 30 MIN: CPT

## 2025-05-05 PROCEDURE — 97165 OT EVAL LOW COMPLEX 30 MIN: CPT

## 2025-05-05 PROCEDURE — 6370000000 HC RX 637 (ALT 250 FOR IP): Performed by: INTERNAL MEDICINE

## 2025-05-05 PROCEDURE — 6360000002 HC RX W HCPCS: Performed by: PHYSICIAN ASSISTANT

## 2025-05-05 RX ADMIN — NALOXEGOL OXALATE 25 MG: 25 TABLET, FILM COATED ORAL at 05:22

## 2025-05-05 RX ADMIN — NADOLOL 40 MG: 20 TABLET ORAL at 09:33

## 2025-05-05 RX ADMIN — LOSARTAN POTASSIUM 50 MG: 25 TABLET, FILM COATED ORAL at 09:32

## 2025-05-05 RX ADMIN — SODIUM CHLORIDE, PRESERVATIVE FREE 10 ML: 5 INJECTION INTRAVENOUS at 09:35

## 2025-05-05 RX ADMIN — ESCITALOPRAM OXALATE 10 MG: 10 TABLET ORAL at 09:32

## 2025-05-05 RX ADMIN — PANTOPRAZOLE SODIUM 40 MG: 40 TABLET, DELAYED RELEASE ORAL at 05:22

## 2025-05-05 RX ADMIN — ENOXAPARIN SODIUM 40 MG: 100 INJECTION SUBCUTANEOUS at 09:34

## 2025-05-05 RX ADMIN — GUAIFENESIN 600 MG: 600 TABLET, EXTENDED RELEASE ORAL at 09:32

## 2025-05-05 ASSESSMENT — PAIN DESCRIPTION - DESCRIPTORS: DESCRIPTORS: BURNING

## 2025-05-05 ASSESSMENT — PAIN SCALES - GENERAL
PAINLEVEL_OUTOF10: 6
PAINLEVEL_OUTOF10: 8

## 2025-05-05 ASSESSMENT — PAIN DESCRIPTION - LOCATION: LOCATION: BACK

## 2025-05-05 ASSESSMENT — PAIN DESCRIPTION - PAIN TYPE: TYPE: CHRONIC PAIN

## 2025-05-05 ASSESSMENT — PAIN DESCRIPTION - ORIENTATION: ORIENTATION: MID

## 2025-05-05 NOTE — CARE COORDINATION
Discharge Planning Note:    Chart reviewed and it appears that patient has minimal needs for discharge at this time. Risk Score n/a- OBS    Primary Care Physician is KP TIRADO   Primary insurance is [employer]    Patient is from home.    Please notify case management if any discharge needs are identified.      Case management will continue to follow progress and update discharge plan as needed.      Electronically signed by GARRY Price on 5/5/2025 at 8:35 AM

## 2025-05-05 NOTE — CARE COORDINATION
05/05/25 1227   IMM Letter   IMM Letter given to Patient/Family/Significant other/Guardian/POA/by: GARRY Price  (Upgrade IMM given)   IMM Letter date given: 05/05/25   IMM Letter time given: 1200

## 2025-05-05 NOTE — DISCHARGE SUMMARY
Hospital Medicine Discharge Summary    Patient ID: Xochitl Woody      Patient's PCP: Nancy Greenberg MD    Admit Date: 5/2/2025     Discharge Date: 5/5/2025     Admitting Physician: Zenia Garcia MD     Discharge Physician: ZENIA GARCIA MD     Hospital Course: This 65-year-old female with PMHx of rheumatoid arthritis hypertension, hyperlipidemia, and chronic regional pain syndrome s/p pain pump (baclofen, clonidine and Dilaudid) who presented with lower back and right knee pain       Lumbar radiculopathy; CT lumbar spine degenerative disc disease and facet arthropathy and L3-4 and L4/5 with mild central stenosis and right foraminal stenosis at L 4-5.  Neurosurgery consulted; underwent MRI lumbar spine which showed multilevel degenerative changes.  No high-grade central canal, lateral recess or foraminal stenosis. Continue pain pump& muscle relaxant   Pt was discharged in stable condition instructed to follow-up with pain management and neurosurgery     Hx of complex regional pain syndrome;   Follows with pain management s/p pain pump placement(baclofen, clonidine and Dilaudid)     Hx of rheumatoid arthritis; on follow-up with rheumatology.  Continue home meds     Hypertension: Continue current regimen monitor BP closely     Hyperlipidemia; continue statins       Physical Exam Performed:     BP (!) 145/62   Pulse 58   Temp 98.1 °F (36.7 °C) (Oral)   Resp 16   Ht 1.6 m (5' 3\")   Wt 66.3 kg (146 lb 2.6 oz)   SpO2 97%   BMI 25.89 kg/m²     General appearance: No apparent distress, appears stated age and cooperative.  Cardiovascular: Regular rhythm, normal S1, S2. No murmur.   Respiratory: Clear to auscultation bilaterally, no wheeze or crackles.   GI: Abdomen soft, no tenderness, not distended, normal bowel sounds  Musculoskeletal:  No cyanosis in digits.  No BLE edema present  Neurology: CN 2-12 grossly intact. No gross deficit noted      Consults:     IP CONSULT TO HOSPITALIST  IP

## 2025-05-05 NOTE — PROGRESS NOTES
HOSPITALISTS PROGRESS NOTE    5/3/2025 9:56 AM        Name: Xochitl Woody .              Admitted: 5/2/2025  Primary Care Provider: Nancy Greenberg MD (Tel: [phone])      Brief Course: This 65-year-old female with PMHx of rheumatoid arthritis hypertension, hyperlipidemia, and chronic regional pain syndrome s/p pain pump (baclofen and Dilaudid) who presented with severe lower back and right knee pain      Interval history:   Pt seen and examined today   Overnight events noted and interval ancillary notes reviewed.  On RA satting well.  Hemodynamically stable  Up in bed; reported lower back pain radiating down to BLE but denied any fever, chills, chest pain, palpitations, SOB, extremity weakness, numbness, tingling, bowel or bladder dysfunction      Assessment & Plan:     Lumbar radiculopathy.  CT lumbar spine degenerative disc disease and facet arthropathy and L3-4 and L4/5 with mild central stenosis and right foraminal stenosis at L 4-5.  Neurosurgery consulted; MRI lumbar spine ordered.  Continue pain pump& muscle relaxant   Started on Decadron.  Continue neurochecks    Hx of complex regional pain syndrome; follows with pain management s/p pain pump placement     Hx of rheumatoid arthritis; on follow-up with rheumatology.  Continue home meds     Hypertension: Continue current regimen monitor BP closely    Hyperlipidemia; continue statins      DVT PPX: Levonox  Code:Full Code    Disposition: Once acute medical issues have resolved    Current Medications  HYDROmorphone HCl PF (DILAUDID) injection 0.5 mg, Q3H PRN   Or  HYDROmorphone HCl PF (DILAUDID) injection 1 mg, Q3H PRN  escitalopram (LEXAPRO) tablet 10 mg, Daily  losartan (COZAAR) tablet 50 mg, Daily  montelukast (SINGULAIR) tablet 10 mg, Nightly  nadolol (CORGARD) tablet 40 mg, Daily  pantoprazole (PROTONIX) tablet 40 mg, QAM AC  pravastatin (PRAVACHOL) tablet 20 mg, 
    Clinical Pharmacy Note    Pharmacy can not supply Rinvoq.   I have notified the nurse and requested the medication be brought from home.     The patient/nurse is unsure if the medication will be able to be brought in.      Please follow to ensure that medication therapy needs are met.    Thanks,  Jose Kirk, PharmD      
  [facility] - Inpatient Rehabilitation Department   Phone: [phone]    Physical Therapy    [x] Initial Evaluation            [] Daily Treatment Note         [] Discharge Summary      Patient: Xochitl Woody   : 1960   MRN: [id number]   Date of Service:  2025  Admitting Diagnosis: Knee pain  Current Admission Summary: This 65-year-old female with PMHx of rheumatoid arthritis hypertension, hyperlipidemia, and chronic regional pain syndrome s/p pain pump (baclofen, clonidine and Dilaudid) who presented with severe lower back and right knee pain    CT scan of L-spine IMPRESSION:  1. No acute intra-abdominal or pelvic process.  2. Constipation.  3. No acute osseous abnormality of the lumbar spine.  4. Degenerative disc disease and facet arthropathy at L3-4 and L4-5 with  central stenosis and right foraminal stenosis at L4-5.      MRI of L-spine Impression:   -L4-L5: Disc bulge, facet and ligament flavum hypertrophy cause mild thecal  sac, moderate right foraminal and mild left foraminal stenosis.  Baastrup  disease.   - Moderate degenerative disease including Baastrup disease.  Past Medical History:  has a past medical history of Colitis, Complex regional pain syndrome I, Depression, Hyperlipidemia, Hypertension, IBS (irritable bowel syndrome), Migraines, RA (rheumatoid arthritis) (HCC), and RSD (reflex sympathetic dystrophy).  Past Surgical History:  has a past surgical history that includes knee surgery (Right); Sympathectomy; Tonsillectomy; Nerve Surgery (Right); baclofen pump implantation (Left); Upper gastrointestinal endoscopy (10/03/2016); Upper gastrointestinal endoscopy (N/A, 2021); Colonoscopy (N/A, 2021); Colonoscopy (2021); Upper gastrointestinal endoscopy (N/A, 2021); and Esophagus dilation (2021).  Discharge Recommendations: Xochitl Woody scored a 23/24 on the AM-PAC short mobility form.  At this time, no further PT is recommended upon discharge due 
  [facility] - Inpatient Rehabilitation Department   Phone: [phone]    Occupational Therapy    [x] Initial Evaluation            [] Daily Treatment Note         [x] Discharge Summary      Patient: Xochitl Woody   : 1960   MRN: [id number]   Date of Service:  2025    Admitting Diagnosis:  Knee pain  Current Admission Summary:   Knee Pain        Pt states she has chronic regional pain syndrome and has a pain pump with \"high dose\" of baclofen and dilaudid states today her medications are not helping her pain, it starts in her knees and goes up her leg.         HISTORY OF PRESENT ILLNESS: 1 or more Elements      History From: Patient                 Chief Complaint: Knee pain     Xochitl Woody is a 65 y.o. female who presents with severe pain that starts in her lower back and goes all the way down to the tip of her toes on her right side.  She states most of her pain is in her right knee.  Patient states she has a history of chronic regional pain syndrome, she has a pain pump with baclofen and Dilaudid.  She states her pain is uncontrolled today, she has electric shock sensations going throughout her body.  She describes this is not a new sensation, she experiences this when her pain is not controlled.  She states normally, she can handle the pain up to a level of an 8 out of 10, but this pain is severe and unable to be managed at home.  She follows with Dr. Palma for pain management.     CT scan of L-spine IMPRESSION:  1. No acute intra-abdominal or pelvic process.  2. Constipation.  3. No acute osseous abnormality of the lumbar spine.  4. Degenerative disc disease and facet arthropathy at L3-4 and L4-5 with  central stenosis and right foraminal stenosis at L4-5.                 MRI of L-spine Impression:              -L4-L5: Disc bulge, facet and ligament flavum hypertrophy cause mild thecal  sac, moderate right foraminal and mild left foraminal stenosis.  Baastrup  disease.           
0805: Shift assessment done, Pt bradycardia, HR- 52 Pt A/O. Neuro checks WNL. Reports pain 5/10 at  this time. Meds given per MAR. Standard safety measures in place. The care plan and education has been reviewed and mutually agreed upon with the patient.       
Pharmacy Home Medication Reconciliation Note    A medication reconciliation has been completed for Xochitl Woody 1960    Pharmacy: [facility] [address] [place]  Information provided by: patient    The patient's home medication list is as follows:  No current facility-administered medications on file prior to encounter.     Current Outpatient Medications on File Prior to Encounter   Medication Sig Dispense Refill    vitamin D (ERGOCALCIFEROL) 1.25 MG (68083 UT) CAPS capsule Take 1 capsule by mouth once a week Sundays.      folic acid (FOLVITE) 1 MG tablet Take 1 tablet by mouth daily      losartan (COZAAR) 50 MG tablet Take 1 tablet by mouth daily      Methylnaltrexone Bromide (RELISTOR) 150 MG TABS Take 3 tablets by mouth daily      Upadacitinib ER (RINVOQ) 15 MG TB24 Take 1 tablet by mouth daily      omeprazole (PRILOSEC) 40 MG delayed release capsule Take 1 capsule by mouth daily      tiZANidine (ZANAFLEX) 2 MG tablet Take 1 tablet by mouth 2 times daily as needed      pravastatin (PRAVACHOL) 20 MG tablet Take 1 tablet by mouth nightly      CLONIDINE HCL, ANALGESIA, EP 49.95 mcg by Intrathecal route daily Pain pump syringe 200.0 mcg / ml      lubiprostone (AMITIZA) 8 MCG CAPS capsule Take 1 capsule by mouth 2 times daily (with meals)      nadolol (CORGARD) 40 MG tablet Take 1 tablet by mouth daily      triamterene-hydrochlorothiazide (DYAZIDE) 37.5-25 MG per capsule Take 1 capsule by mouth every morning      montelukast (SINGULAIR) 10 MG tablet Take 1 tablet by mouth nightly      SUMAtriptan (IMITREX) 100 MG tablet Take 1 tablet by mouth once as needed for Migraine      escitalopram (LEXAPRO) 10 MG tablet Take 1 tablet by mouth daily      HYDROmorphone (DILAUDID) 10 MG/ML injection 4.995 mg by Intrathecal route daily. Pain pump Syringe 20.0mg/ ml    Patient is able to bolus Q6 prn.      baclofen (LIORESAL) 10 MG/20ML SOLN 149.85 mcg by Intrathecal route continuous Pain pump syringe 600.0 
Pt admitted for knee pain due to chronic regional pain syndrome. Pt is A&OX4 and independent at baseline. Pt has implanted pain pump delivering dilaudid, compazine, baclofen. Pain control goals discussed. NPO. Will continue to monitor.  Electronically signed by Jordana Peraza RN on 5/3/2025 at 2:42 AM    
Shift assessment complete. Discussed with pt at length pain control goals. Advised pt for pain tonight we have tylenol and Zanaflex however pt will continue to use her pain pump to regulate her pain. Vitals WNL. Will continue to monitor. Call light within reach.  Electronically signed by Jordana Peraza RN on 5/3/2025 at 10:39 PM    
Shift assessment completed. VSS. AM medications administered as ordered. Alert and oriented. Pt ambulates SBA, tolerating well. Pt states she does feel better today, is ambulating w/o as much shaking compared to yesterday. PRN Tylenol given for pain rated 7/10 in back and headache. PRN Senna given for c/o constipation, pt is passing gas. Tolerating diet well. Pt continues to voice concerns about pain management in the future. Advised pt she will see her pain management MD after discharge. Pt voiced understanding. Ambulated to bathroom and back to bed, new linens provided. Safety measures in place. Call light within reach, friend at bedside.    
Shift assessment completed. VSS. Alert and oriented. Pt ambulates independently, tolerates well. Pt has card from [employer] w/ her ID #, spoke w/ MRI and will fax card down. Pt stated after MRIs [employer] will need to come out to verify pump still works. Pt ambulated to bathroom and is up in chair. Call light within reach.     CXR and Mucinex ordered for congestion.     0928: Pt reporting pain out of control. VSS. PRN Dilaudid given at 0838. Unable to give more at this time. Pt face is flushed. Pt refusing ice, stating that makes her pain worse. Pt reporting muscle spasms, PRN Tizanidine given. Lights off and CARE music on TV.     MRI able to get pump serial number, states pump will stall during test and MD that manages pump will need to be here. Will discuss w/ MD.     Dr. Palma, [facility], [phone]. [facility] for callback. Neuro surgery consulted.     1415: Pt returned from MRI. Ya w/ [employer] contacted to come interrogate pump. Pt remains NPO, educated importance of pt remaining NPO to pt and visitor. Both verbalized understanding.     IV Dilaudid held, pt made aware and voiced understanding.     1650: Per [employer] rep pump is working accurately. Informed MD.     Pump delivers:  Dilaudid 4.246mg/day  Clonidine 53.07 mcg/mL/day  Baclofen 127.37mcg/mL/day    1830: Pt reporting uncontrolled pain. Educated on reason for IV Dilaudid being on hold. Pt concerned for pain control overnight and in the future, stating she is having hypersensitivity and hot flashes. MD notified. One time dose of Percocet ordered. Decadron discontinued.     
Spoke with Dr Bansal, she has spoken with Dr Umana and states pt is ok for MRI, she is to be contacted after and will have Dr Umana or his representative come to check the function of her pump after the MRI.   
injection 0.5 mg, Q3H PRN   Or  [Held by provider] HYDROmorphone HCl PF (DILAUDID) injection 1 mg, Q3H PRN  escitalopram (LEXAPRO) tablet 10 mg, Daily  losartan (COZAAR) tablet 50 mg, Daily  montelukast (SINGULAIR) tablet 10 mg, Nightly  nadolol (CORGARD) tablet 40 mg, Daily  pantoprazole (PROTONIX) tablet 40 mg, QAM AC  pravastatin (PRAVACHOL) tablet 20 mg, Nightly  [Held by provider] triamterene-hydroCHLOROthiazide (MAXZIDE-25) 37.5-25 MG per tablet 1 tablet, QAM  Upadacitinib ER TB24 15 mg (Patient Supplied), Daily  sodium chloride flush 0.9 % injection 5-40 mL, 2 times per day  sodium chloride flush 0.9 % injection 5-40 mL, PRN  0.9 % sodium chloride infusion, PRN  potassium chloride (KLOR-CON M) extended release tablet 40 mEq, PRN   Or  potassium bicarb-citric acid (EFFER-K) effervescent tablet 40 mEq, PRN   Or  potassium chloride 10 mEq/100 mL IVPB (Peripheral Line), PRN  magnesium sulfate 2000 mg in 50 mL IVPB premix, PRN  ondansetron (ZOFRAN) injection 4 mg, Q6H PRN  senna (SENOKOT) tablet 8.6 mg, Daily PRN  acetaminophen (TYLENOL) tablet 650 mg, Q6H PRN   Or  acetaminophen (TYLENOL) suppository 650 mg, Q6H PRN  enoxaparin (LOVENOX) injection 40 mg, Daily  naloxegol (MOVANTIK) tablet 25 mg, QAM AC  guaiFENesin (MUCINEX) extended release tablet 600 mg, BID  naloxone (NARCAN) injection 0.4 mg, PRN        Objective:  BP (!) 145/71   Pulse 63   Temp 98.3 °F (36.8 °C) (Oral)   Resp 18   Ht 1.6 m (5' 3\")   Wt 65.8 kg (145 lb)   SpO2 94%   BMI 25.69 kg/m²     Intake/Output Summary (Last 24 hours) at 5/4/2025 0940  Last data filed at 5/4/2025 0817  Gross per 24 hour   Intake 360 ml   Output --   Net 360 ml      Wt Readings from Last 3 Encounters:   05/02/25 65.8 kg (145 lb)   08/19/21 69.5 kg (153 lb 3.5 oz)   07/17/21 75.3 kg (166 lb 0.1 oz)       Physical Examination:   General appearance:  No apparent distress, appears stated age and cooperative.  HEENT: Normocephalic, sclera clear., PERRLA.  Trachea

## 2025-05-05 NOTE — PLAN OF CARE
Problem: Pain  Goal: Verbalizes/displays adequate comfort level or baseline comfort level  5/4/2025 2028 by Jordana Peraza RN  Outcome: Progressing  5/4/2025 0838 by Naz Kern RN  Outcome: Progressing     Problem: Discharge Planning  Goal: Discharge to home or other facility with appropriate resources  5/4/2025 2028 by Jordana Peraza RN  Outcome: Progressing  5/4/2025 0838 by Naz Kern RN  Outcome: Progressing     Problem: Safety - Adult  Goal: Free from fall injury  5/4/2025 2028 by Jordana Peraza RN  Outcome: Progressing  5/4/2025 0838 by Naz Kern RN  Outcome: Progressing     Problem: ABCDS Injury Assessment  Goal: Absence of physical injury  5/4/2025 2028 by Jordana Peraza RN  Outcome: Progressing  5/4/2025 0838 by Naz Kern RN  Outcome: Progressing

## 2025-05-05 NOTE — PLAN OF CARE
Problem: Pain  Goal: Verbalizes/displays adequate comfort level or baseline comfort level  5/5/2025 1231 by Marjan Hoff RN  Outcome: Adequate for Discharge  5/5/2025 1053 by Marjan Hoff RN  Outcome: Progressing     Problem: Discharge Planning  Goal: Discharge to home or other facility with appropriate resources  5/5/2025 1231 by Marjan Hoff RN  Outcome: Adequate for Discharge  5/5/2025 1053 by aMrjan Hoff RN  Outcome: Progressing     Problem: Safety - Adult  Goal: Free from fall injury  5/5/2025 1231 by Marjan Hoff RN  Outcome: Adequate for Discharge  5/5/2025 1053 by Marjan Hoff RN  Outcome: Progressing     Problem: ABCDS Injury Assessment  Goal: Absence of physical injury  5/5/2025 1231 by Marjan Hoff RN  Outcome: Adequate for Discharge  5/5/2025 1053 by Marjan Hoff RN  Outcome: Progressing

## 2025-05-05 NOTE — DISCHARGE INSTRUCTIONS
Follow up with your PCP within 7 days of discharge.  Follow up with pain management upon discharge  Follow up with neurosurgery upon discharge  Take all your medications as prescribed.

## 2025-05-05 NOTE — CARE COORDINATION
Case Management -  Discharge Note      Patient Name: Xochitl Woody                   YOB: 1960  Room: [address] [place], [place] [id number]            Readmission Risk (Low < 19, Mod (19-27), High > 27): Readmission Risk Score: 8    Current PCP: Nancy Greenberg MD      (IMM) Important Message from Medicare:    Has pt received appropriate compliance notices before being discharged if required: yes  Compliance doc:  [x] 2nd IMM; [] Code 44 [] Fuentes  Date Given: 5/5/25 Given By: CRISTA    PT AM-PAC: 23 /24  OT AM-PAC:   /24      Financial    Payor: [facility] MEDICARE / Plan: [employer] DUAL COMPLETE / Product Type: *No Product type* /     Pharmacy:  Potential assistance Purchasing Medications:    Meds-to-Beds request:        [id number] [place], [place] - [address] - P [phone] - F [phone] [address] [facility] [address]  Phone: [phone] Fax: [phone]      Notes:    Additional Case Management Notes: CM discussed HHC with patient. Patient declined. Patient stated she already has appointments set up with her PCP and pain management clinic. Patient is discharging to home. Family/friends to transport patient home at discharge.    Electronically signed by GARRY Price on 5/5/2025 at 12:26 PM

## (undated) DEVICE — ENDOSCOPY KIT: Brand: MEDLINE INDUSTRIES, INC.

## (undated) DEVICE — BITE BLOCK ENDOSCP AD 60 FR W/ ADJ STRP PLAS GRN BLOX

## (undated) DEVICE — CONTAINER SPEC 480ML CLR POLYSTYR 10% NEUT BUFF FRMLN ZN

## (undated) DEVICE — FORCEPS BX 240CM 2.4MM L NDL RAD JAW 4 M00513334

## (undated) DEVICE — TRAP POLYP ETRAP